# Patient Record
Sex: MALE | Race: BLACK OR AFRICAN AMERICAN | NOT HISPANIC OR LATINO | Employment: OTHER | URBAN - METROPOLITAN AREA
[De-identification: names, ages, dates, MRNs, and addresses within clinical notes are randomized per-mention and may not be internally consistent; named-entity substitution may affect disease eponyms.]

---

## 2017-02-01 ENCOUNTER — HOSPITAL ENCOUNTER (EMERGENCY)
Facility: HOSPITAL | Age: 57
Discharge: HOME | End: 2017-02-01
Attending: EMERGENCY MEDICINE
Payer: MEDICARE

## 2017-02-01 VITALS
TEMPERATURE: 99 F | HEIGHT: 68 IN | SYSTOLIC BLOOD PRESSURE: 113 MMHG | OXYGEN SATURATION: 99 % | WEIGHT: 160 LBS | HEART RATE: 99 BPM | DIASTOLIC BLOOD PRESSURE: 65 MMHG | BODY MASS INDEX: 24.25 KG/M2 | RESPIRATION RATE: 20 BRPM

## 2017-02-01 DIAGNOSIS — F29 PSYCHOSIS, UNSPECIFIED PSYCHOSIS TYPE: ICD-10-CM

## 2017-02-01 DIAGNOSIS — F20.3 UNDIFFERENTIATED SCHIZOPHRENIA: Primary | Chronic | ICD-10-CM

## 2017-02-01 LAB
ALBUMIN SERPL BCP-MCNC: 3.8 G/DL
ALP SERPL-CCNC: 228 U/L
ALT SERPL W/O P-5'-P-CCNC: 19 U/L
AMPHET+METHAMPHET UR QL: NEGATIVE
ANION GAP SERPL CALC-SCNC: 13 MMOL/L
APAP SERPL-MCNC: <3 UG/ML
AST SERPL-CCNC: 21 U/L
BARBITURATES UR QL SCN>200 NG/ML: NEGATIVE
BASOPHILS # BLD AUTO: 0.01 K/UL
BASOPHILS NFR BLD: 0.2 %
BENZODIAZ UR QL SCN>200 NG/ML: NEGATIVE
BILIRUB SERPL-MCNC: 0.5 MG/DL
BILIRUB UR QL STRIP: NEGATIVE
BUN SERPL-MCNC: 10 MG/DL
BZE UR QL SCN: NEGATIVE
CALCIUM SERPL-MCNC: 9.5 MG/DL
CANNABINOIDS UR QL SCN: NEGATIVE
CHLORIDE SERPL-SCNC: 102 MMOL/L
CLARITY UR REFRACT.AUTO: CLEAR
CO2 SERPL-SCNC: 23 MMOL/L
COLOR UR AUTO: YELLOW
CREAT SERPL-MCNC: 0.7 MG/DL
CREAT UR-MCNC: 52.7 MG/DL
DIFFERENTIAL METHOD: ABNORMAL
EOSINOPHIL # BLD AUTO: 0.3 K/UL
EOSINOPHIL NFR BLD: 4.3 %
ERYTHROCYTE [DISTWIDTH] IN BLOOD BY AUTOMATED COUNT: 14.8 %
EST. GFR  (AFRICAN AMERICAN): >60 ML/MIN/1.73 M^2
EST. GFR  (NON AFRICAN AMERICAN): >60 ML/MIN/1.73 M^2
ETHANOL SERPL-MCNC: <10 MG/DL
GLUCOSE SERPL-MCNC: 80 MG/DL
GLUCOSE UR QL STRIP: NEGATIVE
HCT VFR BLD AUTO: 41.3 %
HGB BLD-MCNC: 14.1 G/DL
HGB UR QL STRIP: NEGATIVE
KETONES UR QL STRIP: NEGATIVE
LEUKOCYTE ESTERASE UR QL STRIP: NEGATIVE
LYMPHOCYTES # BLD AUTO: 2 K/UL
LYMPHOCYTES NFR BLD: 33.1 %
MCH RBC QN AUTO: 29.2 PG
MCHC RBC AUTO-ENTMCNC: 34.1 %
MCV RBC AUTO: 86 FL
METHADONE UR QL SCN>300 NG/ML: NEGATIVE
MONOCYTES # BLD AUTO: 0.7 K/UL
MONOCYTES NFR BLD: 12.2 %
NEUTROPHILS # BLD AUTO: 3.1 K/UL
NEUTROPHILS NFR BLD: 50 %
NITRITE UR QL STRIP: NEGATIVE
OPIATES UR QL SCN: NEGATIVE
PCP UR QL SCN>25 NG/ML: NEGATIVE
PH UR STRIP: 7 [PH] (ref 5–8)
PLATELET # BLD AUTO: 244 K/UL
PMV BLD AUTO: 9 FL
POTASSIUM SERPL-SCNC: 3.8 MMOL/L
PROT SERPL-MCNC: 7.7 G/DL
PROT UR QL STRIP: NEGATIVE
RBC # BLD AUTO: 4.83 M/UL
SALICYLATES SERPL-MCNC: <5 MG/DL
SODIUM SERPL-SCNC: 138 MMOL/L
SP GR UR STRIP: 1.01 (ref 1–1.03)
TOXICOLOGY INFORMATION: NORMAL
TSH SERPL DL<=0.005 MIU/L-ACNC: 0.54 UIU/ML
URN SPEC COLLECT METH UR: NORMAL
UROBILINOGEN UR STRIP-ACNC: <2 EU/DL
WBC # BLD AUTO: 6.08 K/UL

## 2017-02-01 PROCEDURE — 99285 EMERGENCY DEPT VISIT HI MDM: CPT

## 2017-02-01 PROCEDURE — 82570 ASSAY OF URINE CREATININE: CPT

## 2017-02-01 PROCEDURE — 84443 ASSAY THYROID STIM HORMONE: CPT

## 2017-02-01 PROCEDURE — 85025 COMPLETE CBC W/AUTO DIFF WBC: CPT

## 2017-02-01 PROCEDURE — 80320 DRUG SCREEN QUANTALCOHOLS: CPT

## 2017-02-01 PROCEDURE — 81003 URINALYSIS AUTO W/O SCOPE: CPT

## 2017-02-01 PROCEDURE — 80329 ANALGESICS NON-OPIOID 1 OR 2: CPT

## 2017-02-01 PROCEDURE — 80307 DRUG TEST PRSMV CHEM ANLYZR: CPT | Mod: 59

## 2017-02-01 PROCEDURE — 80053 COMPREHEN METABOLIC PANEL: CPT

## 2017-02-01 RX ORDER — ZIPRASIDONE MESYLATE 20 MG/ML
20 INJECTION, POWDER, LYOPHILIZED, FOR SOLUTION INTRAMUSCULAR
Status: DISCONTINUED | OUTPATIENT
Start: 2017-02-01 | End: 2017-02-01

## 2017-02-01 NOTE — ED NOTES
Dr. Anand states pt to be PEC'd. Linh JOHNSON made aware. [facility] preparing room and pt. Kavon Colvin notified per Linh LEONARD

## 2017-02-01 NOTE — ED AVS SNAPSHOT
[facility]  [id number] [address] [address]               [place]   2017  8:53 AM   ED    Description:  Male : 1960   Department:  [facility]           Your Care was Coordinated By:     Provider Role From To    Aaron Anand MD Attending Provider 17 0850 --      Reason for Visit     verbal altercation           Diagnoses this Visit        Comments    Undifferentiated schizophrenia    -  Primary     Psychosis, unspecified psychosis type           ED Disposition     ED Disposition Condition Comment    Discharge             To Do List           [facility]     [facility] Care [facility] -  Assistance  Registered nurses in the [facility] provide clinical advisement, health education, appointment booking, and other advisory services.  Call for this free service at [phone].             Medications           Message regarding Medications     Verify the changes and/or additions to your medication regime listed below are the same as discussed with your clinician today.  If any of these changes or additions are incorrect, please notify your healthcare provider.        These medications were administered today        Dose Freq    ziprasidone injection 20 mg 20 mg ED 1 Time    Sig: Inject 20 mg into the muscle ED 1 Time.    Class: Normal    Route: Intramuscular           Verify that the below list of medications is an accurate representation of the medications you are currently taking.  If none reported, the list may be blank. If incorrect, please contact your healthcare provider. Carry this list with you in case of emergency.           Current Medications     benztropine (COGENTIN) 2 MG Tab Take 1 mg by mouth 2 (two) times daily.    divalproex (DEPAKOTE) 250 MG EC tablet Take 250 mg by mouth 3 (three) times daily.    ergocalciferol (ERGOCALCIFEROL) 50,000 unit Cap Take 50,000 Units by mouth every 7 days.     "ibuprofen (ADVIL,MOTRIN) 600 MG tablet Take 600 mg by mouth 3 (three) times daily.    lamotrigine (LAMICTAL) 25 MG tablet Take 1 tablet (25mg) daily x7 days, starting 9/16 take 2 tablets (50mg) daily x7 days, starting 9/23 take 3 tablets (75mg) daily x7 days.    levetiracetam (KEPPRA) 500 MG Tab Take 1,000 mg by mouth every evening.    multivitamin capsule Take 1 capsule by mouth once daily.    omeprazole (PRILOSEC) 20 MG capsule Take 20 mg by mouth once daily.    propranolol (INDERAL) 10 MG tablet Take 10 mg by mouth once daily.     quetiapine (SEROQUEL) 200 MG Tab Take by mouth.    ziprasidone injection 20 mg Inject 20 mg into the muscle ED 1 Time.           Clinical Reference Information           Your Vitals Were     BP Pulse Temp Resp Height Weight    156/81 (BP Location: Right arm, Patient Position: Sitting) 91 98.5 °F (36.9 °C) (Oral) 20 5' 8" (1.727 m) 72.6 kg (160 lb)    SpO2 BMI             98% 24.33 kg/m2         Allergies as of 2/1/2017     No Known Allergies      Immunizations Administered on Date of Encounter - 2/1/2017     None      ED Micro, Lab, POCT     Start Ordered       Status Ordering Provider    02/01/17 0854 02/01/17 0854  CBC auto differential  STAT      Final result     02/01/17 0854 02/01/17 0854  Comprehensive metabolic panel  STAT      Final result     02/01/17 0854 02/01/17 0854  Urinalysis  STAT      Final result     02/01/17 0854 02/01/17 0854  TSH  STAT      Final result     02/01/17 0854 02/01/17 0854  Drug screen panel, emergency  STAT      Final result     02/01/17 0854 02/01/17 0854  Ethanol  STAT      Final result     02/01/17 0854 02/01/17 0854  Salicylate level  STAT      Final result     02/01/17 0854 02/01/17 0854  Acetaminophen level  STAT      Final result       ED Imaging Orders     Start Ordered       Status Ordering Provider    02/01/17 0945 02/01/17 0944  X-ray Shoulder 2 or More Views Right  1 time imaging      Final result     02/01/17 0854 02/01/17 0854  CT Head " Without Contrast  1 time imaging      Final result         Discharge Instructions         Treating Schizophrenia  The symptoms of schizophrenia are severe and ongoing. They can disrupt lives and cause great suffering. But treatment may help relieve many of these symptoms. Most often, treatment includes both medication and counseling (psychotherapy). It also may involve help with social and life skills.    Medications  Medication is a key part of any treatment for schizophrenia. Medications known as antipsychotics can help ease present symptoms. They also may prevent future problems. These medications can have side effects. To avoid side effects, some people may even stop taking their medications. Unfortunately, this can cause their symptoms to come back. If your loved one has problems with medication, tell the health care provider. Changing the dose or type of medication may help. Your support and caring can also help a loved one stick with treatment.  Counseling (psychotherapy)  A therapist can help your loved one deal with problems caused by schizophrenia. Therapy may focus on healing relationships or coping with the disorder. A therapist can also provide emotional support.    Some people with schizophrenia may not be able to work. They also may lack basic life skills. For instance, they may not know how to shop or manage money. Some may not be able to care for themselves. Fortunately, there are professionals who can help them learn these skills. If you cant care for your loved one, there are special places he or she can live, such as [facility] houses and group homes. They are safe places for your loved one to start building a new life. There are also agencies that can assist with needs such as improving life skills and finding housing.  Looking ahead  Research into schizophrenia is ongoing. This may lead to improved treatments in the future. There is always hope for a better  life.  Resources  National [place] of Mental Health  788-458-3549  www.[facility].nih.gov  National [place] on Mental Illness  775.121.5168  www.reed.org  Mental Health [place]  545.599.2495  www.nmha.org  Schizophrenia.com  www.schizophrenia.com   © 2269-0910 [employer]. [address] [place] [address], [place] [address]. All rights reserved. This information is not intended as a substitute for professional medical care. Always follow your healthcare professional's instructions.          [facility] Sign-Up     Activating your [facility] account is as easy as 1-2-3!     1) Visit [email or web address], select Sign Up Now, enter this activation code and your date of birth, then select Next.  [email or web address]  Expires: 3/18/2017 11:03 AM      2) Create a username and password to use when you visit [facility] in the future and select a security question in case you lose your password and select Next.    3) Enter your e-mail address and click Sign Up!    Additional Information  If you have questions, please e-mail [email or web address] or call [phone] to talk to our [facility] staff. Remember, [facility] is NOT to be used for urgent needs. For medical emergencies, dial 911.         Smoking Cessation     If you would like to quit smoking:   You may be eligible for free services if you are a [place] resident and started smoking cigarettes before September 1, 1988.  Call the Smoking Cessation Trust (SCT) toll free at (057) 131-3451 or (422) 786-4567.   Call 3-800-QUIT-NOW if you do not meet the above criteria.             [facility] complies with applicable Federal civil rights laws and does not discriminate on the basis of race, color, national origin, age, disability, or sex.        Language Assistance Services     ATTENTION: Language assistance services are available, free of charge. Please call [phone].      ATENCIÓN: Si habla español, tiene a de la garza disposición servicios gratuitos de asistencia  lingüística. [facility] [phone].     REDD Ý: N?u b?n nói Ti?ng Vi?t, có các d?ch v? h? tr? ngôn ng? mi?n phí dành cho b?n. G?i s? [phone].

## 2017-02-01 NOTE — DISCHARGE INSTRUCTIONS
Treating Schizophrenia  The symptoms of schizophrenia are severe and ongoing. They can disrupt lives and cause great suffering. But treatment may help relieve many of these symptoms. Most often, treatment includes both medication and counseling (psychotherapy). It also may involve help with social and life skills.    Medications  Medication is a key part of any treatment for schizophrenia. Medications known as antipsychotics can help ease present symptoms. They also may prevent future problems. These medications can have side effects. To avoid side effects, some people may even stop taking their medications. Unfortunately, this can cause their symptoms to come back. If your loved one has problems with medication, tell the health care provider. Changing the dose or type of medication may help. Your support and caring can also help a loved one stick with treatment.  Counseling (psychotherapy)  A therapist can help your loved one deal with problems caused by schizophrenia. Therapy may focus on healing relationships or coping with the disorder. A therapist can also provide emotional support.    Some people with schizophrenia may not be able to work. They also may lack basic life skills. For instance, they may not know how to shop or manage money. Some may not be able to care for themselves. Fortunately, there are professionals who can help them learn these skills. If you cant care for your loved one, there are special places he or she can live, such as nursing home houses and group homes. They are safe places for your loved one to start building a new life. There are also agencies that can assist with needs such as improving life skills and finding housing.  Looking ahead  Research into schizophrenia is ongoing. This may lead to improved treatments in the future. There is always hope for a better life.  Resources  National [place] of Mental Health  329.527.8842  www.[facility].nih.gov  National [place] on  Mental Illness  697.291.6576  www.reed.org  Mental Health [place]  121.443.5246  www.[facility].org  Schizophrenia.com  www.schizophrenia.com   © 7503-4605 [employer]. [address] [place] [address], [place] [address]. All rights reserved. This information is not intended as a substitute for professional medical care. Always follow your healthcare professional's instructions.

## 2017-02-01 NOTE — ED NOTES
pts caregiver at bedside and states pt did not receive his meds this am because she was late and mother told her to come to [facility] to  pt . Dr. Anand spoke with her and pt . Pt calm and cooperative. Caregiver to give meds and sit with pt and pts mother at there home.Dr. Anand cancelled  pec. House sup informed per Linh castro rn and  office notified.

## 2017-02-01 NOTE — ED NOTES
After starting Iv, pts right arm was moved causing pt to yell with pain to right shoulder. MD informed

## 2017-02-01 NOTE — ED PROVIDER NOTES
Encounter Date: 2/1/2017       History     Chief Complaint   Patient presents with    verbal altercation     with mother when she called 911. Pt was calm prior to [facility] arrival and then was uncooperatiev saying everything hurt. Pt awake and oriented to self .      Review of patient's allergies indicates:  No Known Allergies  Patient is a 56 y.o. male presenting with the following complaint: mental health disorder. The history is provided by the EMS personnel.   Mental Health Problem   The primary symptoms include bizarre behavior, disorganized speech and negative symptoms. The current episode started today. This is a chronic problem.   The onset of the illness is precipitated by emotional stress and stressful event. The degree of incapacity that he is experiencing as a consequence of his illness is severe. Sequelae of the illness include harmed interpersonal relations and an inability to work. Risk factors that are present for mental illness include a history of mental illness.     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  Past Surgical History   Procedure Laterality Date    Brain surgery      Shoulder surgery      Knee surgery       Family History   Problem Relation Age of Onset    Kidney disease Mother     Diabetes Mother      Social History   Substance Use Topics    Smoking status: Current Every Day Smoker     Packs/day: 1.00     Years: 32.00     Types: Cigarettes    Smokeless tobacco: Never Used    Alcohol use No     Review of Systems   Constitutional: Negative for fever.   HENT: Negative for sore throat.    Respiratory: Negative for shortness of breath.    Cardiovascular: Negative for chest pain.   Gastrointestinal: Negative for nausea.   Genitourinary: Negative for  "dysuria.   Musculoskeletal: Negative for back pain.   Skin: Negative for rash.   Neurological: Negative for weakness.   Hematological: Does not bruise/bleed easily.       Physical Exam   Initial Vitals   BP Pulse Resp Temp SpO2   02/01/17 0855 02/01/17 0855 02/01/17 0855 02/01/17 0855 02/01/17 0855   156/81 91 20 98.5 °F (36.9 °C) 98 %     Physical Exam    Constitutional: He appears well-developed and well-nourished. No distress.   HENT:   Head: Normocephalic and atraumatic.   Eyes: Conjunctivae are normal. Pupils are equal, round, and reactive to light.   Neck: Normal range of motion. Neck supple.   Cardiovascular: Normal rate, regular rhythm and normal heart sounds.   Pulmonary/Chest: Breath sounds normal.   Abdominal: Soft. Bowel sounds are normal.   Musculoskeletal: Normal range of motion.   Neurological: He is alert and oriented to person, place, and time. No cranial nerve deficit.   Skin: Skin is warm and dry.   Psychiatric: His affect is blunt. His speech is delayed. He is slowed and withdrawn.         ED Course   Procedures  Labs Reviewed   CBC W/ AUTO DIFFERENTIAL - Abnormal; Notable for the following:        Result Value    RDW 14.8 (*)     MPV 9.0 (*)     All other components within normal limits   COMPREHENSIVE METABOLIC PANEL - Abnormal; Notable for the following:     Alkaline Phosphatase 228 (*)     All other components within normal limits   SALICYLATE LEVEL - Abnormal; Notable for the following:     Salicylate Lvl <5.0 (*)     All other components within normal limits   ACETAMINOPHEN LEVEL - Abnormal; Notable for the following:     Acetaminophen (Tylenol), Serum <3.0 (*)     All other components within normal limits   URINALYSIS   TSH   DRUG SCREEN PANEL, URINE EMERGENCY   ALCOHOL,MEDICAL (ETHANOL)        ED Vital Signs:  Vitals:    02/01/17 0855   BP: (!) 156/81   Pulse: 91   Resp: 20   Temp: 98.5 °F (36.9 °C)   TempSrc: Oral   SpO2: 98%   Weight: 72.6 kg (160 lb)   Height: 5' 8" (1.727 m) "         Abnormal Lab Results:  Labs Reviewed   CBC W/ AUTO DIFFERENTIAL - Abnormal; Notable for the following:        Result Value    RDW 14.8 (*)     MPV 9.0 (*)     All other components within normal limits   COMPREHENSIVE METABOLIC PANEL - Abnormal; Notable for the following:     Alkaline Phosphatase 228 (*)     All other components within normal limits   SALICYLATE LEVEL - Abnormal; Notable for the following:     Salicylate Lvl <5.0 (*)     All other components within normal limits   ACETAMINOPHEN LEVEL - Abnormal; Notable for the following:     Acetaminophen (Tylenol), Serum <3.0 (*)     All other components within normal limits   URINALYSIS   TSH   DRUG SCREEN PANEL, URINE EMERGENCY   ALCOHOL,MEDICAL (ETHANOL)          All Lab Results:  Results for orders placed or performed during the hospital encounter of 02/01/17   CBC auto differential   Result Value Ref Range    WBC 6.08 3.90 - 12.70 K/uL    RBC 4.83 4.60 - 6.20 M/uL    Hemoglobin 14.1 14.0 - 18.0 g/dL    Hematocrit 41.3 40.0 - 54.0 %    MCV 86 82 - 98 fL    MCH 29.2 27.0 - 31.0 pg    MCHC 34.1 32.0 - 36.0 %    RDW 14.8 (H) 11.5 - 14.5 %    Platelets 244 150 - 350 K/uL    MPV 9.0 (L) 9.2 - 12.9 fL    Gran # 3.1 1.8 - 7.7 K/uL    Lymph # 2.0 1.0 - 4.8 K/uL    Mono # 0.7 0.3 - 1.0 K/uL    Eos # 0.3 0.0 - 0.5 K/uL    Baso # 0.01 0.00 - 0.20 K/uL    Gran% 50.0 38.0 - 73.0 %    Lymph% 33.1 18.0 - 48.0 %    Mono% 12.2 4.0 - 15.0 %    Eosinophil% 4.3 0.0 - 8.0 %    Basophil% 0.2 0.0 - 1.9 %    Differential Method Automated    Comprehensive metabolic panel   Result Value Ref Range    Sodium 138 136 - 145 mmol/L    Potassium 3.8 3.5 - 5.1 mmol/L    Chloride 102 95 - 110 mmol/L    CO2 23 23 - 29 mmol/L    Glucose 80 70 - 110 mg/dL    BUN, Bld 10 6 - 20 mg/dL    Creatinine 0.7 0.5 - 1.4 mg/dL    Calcium 9.5 8.7 - 10.5 mg/dL    Total Protein 7.7 6.0 - 8.4 g/dL    Albumin 3.8 3.5 - 5.2 g/dL    Total Bilirubin 0.5 0.1 - 1.0 mg/dL    Alkaline Phosphatase 228 (H) 55 -  135 U/L    AST 21 10 - 40 U/L    ALT 19 10 - 44 U/L    Anion Gap 13 8 - 16 mmol/L    eGFR if African American >60.0 >60 mL/min/1.73 m^2    eGFR if non African American >60.0 >60 mL/min/1.73 m^2   Urinalysis   Result Value Ref Range    Specimen UA Urine, Clean Catch     Color, UA Yellow Yellow, Straw, Leslee    Appearance, UA Clear Clear    pH, UA 7.0 5.0 - 8.0    Specific Gravity, UA 1.010 1.005 - 1.030    Protein, UA Negative Negative    Glucose, UA Negative Negative    Ketones, UA Negative Negative    Bilirubin (UA) Negative Negative    Occult Blood UA Negative Negative    Nitrite, UA Negative Negative    Urobilinogen, UA <2.0 <2.0 EU/dL    Leukocytes, UA Negative Negative   TSH   Result Value Ref Range    TSH 0.541 0.400 - 4.000 uIU/mL   Drug screen panel, emergency   Result Value Ref Range    Benzodiazepines Negative     Methadone metabolites Negative     Cocaine (Metab.) Negative     Opiate Scrn, Ur Negative     Barbiturate Screen, Ur Negative     Amphetamine Screen, Ur Negative     THC Negative     Phencyclidine Negative     Creatinine, Random Ur 52.7 23.0 - 375.0 mg/dL    Toxicology Information SEE COMMENT    Ethanol   Result Value Ref Range    Alcohol, Medical, Serum <10 <10 mg/dL   Salicylate level   Result Value Ref Range    Salicylate Lvl <5.0 (L) 15.0 - 30.0 mg/dL   Acetaminophen level   Result Value Ref Range    Acetaminophen (Tylenol), Serum <3.0 (L) 10.0 - 20.0 ug/mL           Imaging Results:  Imaging Results         X-ray Shoulder 2 or More Views Right (Final result) Result time:  02/01/17 10:03:26    Final result by Cecelia Schofield MD (02/01/17 10:03:26)    Impression:      No definite acute process.  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      Electronically signed by: CECELIA SCHOFIELD MD  Date:     02/01/17  Time:    10:03     Narrative:    Exam: XR SHOULDER COMPLETE 2 VIEW RIGHT,    Date:  02/01/17 09:44:48    History: Right arm  pain.    Comparison:  No prior relevant studies available    Findings: Postoperative changes of the right shoulder are noted with a right humeral intramedullary lucie.  The lucie extends superior to the humeral neck.  Post traumatic healed deformity is present.  Prominent right shoulder arthropathy is present.            CT Head Without Contrast (Final result) Result time:  02/01/17 10:02:03    Final result by Cecelia Schofield MD (02/01/17 10:02:03)    Impression:      No acute findings.  Postoperative changes.  Atrophy with multifocal encephalomalacia.        All CT scans at this facility use dose modulation, iterative reconstruction and/or weight based dosing when appropriate to reduce radiation dose to as low as reasonably achievable.       Electronically signed by: CECELIA SCHOFIELD MD  Date:     02/01/17  Time:    10:02     Narrative:    CT HEAD WITHOUT CONTRAST     History:  Altered mental status.    Technique:  Noncontrast CT of the brain. Comparison with 09/07/2016.    Findings:  There are postoperative changes.  A left craniotomy is noted.  Right cranial vipul hole is evident.  Postoperative changes of the right base are also noted as well with old right facial fractures.  Intracranially there is moderate to marked ventriculomegaly.  Symmetric basal ganglia calcification is noted.  Left hemispheric volume loss with left temporal and parietal encephalomalacia as noted.    Small zone of right frontal encephalomalacia is noted.    No acute hemorrhage or mass effect.                 The Emergency Provider reviewed the vital signs and test results, which are outlined above.    ED Discussions:  10:28 AM: The PEC hold has been issued by Dr. Hernandez at this time for psychosis and schizophrenia.      10:38 AM: Pt has been medically cleared by Dr. Anand at this time. Reassessed pt at this time. Pt is resting comfortably and appears in no acute distress. There are no psychiatric services offered at this facility. D/w  pt all pertinent ED information and plan to transfer to psychiatric facility for psychiatric treatment. All questions and complaints have been addressed at this time. Pt condition is stable at this time and is clear to transfer to psychiatric facility at this time.     11:03 AM  Patient's caregiver is now at the bedside, and is giving a more detailed account of what had happened.  States he was mad at his mother, because he thought that she took his money.  There was no physical altercation.  The caregiver states that he is at his mental baseline, and feels comfortable taking him home.  States his mother forgot to give him his meds this morning and she will give them to him as soon as they get home.  All questions answered, and the patient is stable and ready for discharge.                       ED Course     Clinical Impression:       ICD-10-CM ICD-9-CM   1. Undifferentiated schizophrenia F20.3 295.90   2. Psychosis, unspecified psychosis type F29 298.9         Disposition:   Disposition: Discharged  Condition: Stable       Aaron Anand MD  02/01/17 1038       Aaron Anand MD  02/01/17 1105

## 2017-03-14 ENCOUNTER — OFFICE VISIT (OUTPATIENT)
Dept: INTERNAL MEDICINE | Facility: CLINIC | Age: 57
End: 2017-03-14
Payer: MEDICARE

## 2017-03-14 VITALS
BODY MASS INDEX: 25.13 KG/M2 | HEIGHT: 65 IN | HEART RATE: 88 BPM | DIASTOLIC BLOOD PRESSURE: 95 MMHG | SYSTOLIC BLOOD PRESSURE: 138 MMHG | TEMPERATURE: 98 F | OXYGEN SATURATION: 95 % | RESPIRATION RATE: 16 BRPM | WEIGHT: 150.81 LBS

## 2017-03-14 DIAGNOSIS — R56.9 SEIZURES: Chronic | ICD-10-CM

## 2017-03-14 DIAGNOSIS — G20.A1 PARKINSONS DISEASE: ICD-10-CM

## 2017-03-14 DIAGNOSIS — R03.0 ELEVATED BP WITHOUT DIAGNOSIS OF HYPERTENSION: ICD-10-CM

## 2017-03-14 DIAGNOSIS — G25.1 VALPROIC ACID-INDUCED TREMOR: Chronic | ICD-10-CM

## 2017-03-14 DIAGNOSIS — F20.3 UNDIFFERENTIATED SCHIZOPHRENIA: Primary | Chronic | ICD-10-CM

## 2017-03-14 DIAGNOSIS — M19.011 ARTHROPATHY OF RIGHT SHOULDER: ICD-10-CM

## 2017-03-14 DIAGNOSIS — T42.6X5A VALPROIC ACID-INDUCED TREMOR: Chronic | ICD-10-CM

## 2017-03-14 PROCEDURE — 99999 PR PBB SHADOW E&M-EST. PATIENT-LVL IV: CPT | Mod: PBBFAC,,, | Performed by: NURSE PRACTITIONER

## 2017-03-14 PROCEDURE — 99214 OFFICE O/P EST MOD 30 MIN: CPT | Mod: S$GLB,,, | Performed by: NURSE PRACTITIONER

## 2017-03-14 PROCEDURE — 1160F RVW MEDS BY RX/DR IN RCRD: CPT | Mod: S$GLB,,, | Performed by: NURSE PRACTITIONER

## 2017-03-14 RX ORDER — LEVETIRACETAM 1000 MG/1
1000 TABLET ORAL 2 TIMES DAILY
Qty: 60 TABLET | Refills: 1 | Status: SHIPPED | OUTPATIENT
Start: 2017-03-14 | End: 2017-05-01 | Stop reason: SDUPTHER

## 2017-03-14 RX ORDER — HALOPERIDOL 1 MG/1
1 TABLET ORAL 3 TIMES DAILY
COMMUNITY
End: 2017-03-14 | Stop reason: SDUPTHER

## 2017-03-14 RX ORDER — LACOSAMIDE 100 MG/1
100 TABLET ORAL 2 TIMES DAILY
Qty: 60 TABLET | Refills: 1 | Status: SHIPPED | OUTPATIENT
Start: 2017-03-14 | End: 2017-06-27 | Stop reason: SDUPTHER

## 2017-03-14 RX ORDER — BENZTROPINE MESYLATE 1 MG/1
1 TABLET ORAL DAILY
Qty: 30 TABLET | Refills: 1 | Status: SHIPPED | OUTPATIENT
Start: 2017-03-14 | End: 2017-06-27 | Stop reason: SDUPTHER

## 2017-03-14 RX ORDER — MELOXICAM 15 MG/1
15 TABLET ORAL DAILY PRN
Qty: 30 TABLET | Refills: 5 | Status: SHIPPED | OUTPATIENT
Start: 2017-03-14 | End: 2017-06-27 | Stop reason: SDUPTHER

## 2017-03-14 RX ORDER — HALOPERIDOL 1 MG/1
1 TABLET ORAL 3 TIMES DAILY
Qty: 90 TABLET | Refills: 1 | Status: SHIPPED | OUTPATIENT
Start: 2017-03-14 | End: 2017-06-27 | Stop reason: SDUPTHER

## 2017-03-14 RX ORDER — LACOSAMIDE 100 MG/1
50 TABLET ORAL 2 TIMES DAILY
COMMUNITY
End: 2017-03-14 | Stop reason: SDUPTHER

## 2017-03-14 RX ORDER — OLANZAPINE 5 MG/1
5 TABLET ORAL 2 TIMES DAILY
Qty: 60 TABLET | Refills: 1 | Status: SHIPPED | OUTPATIENT
Start: 2017-03-14 | End: 2017-05-30 | Stop reason: DRUGHIGH

## 2017-03-14 RX ORDER — OLANZAPINE 5 MG/1
5 TABLET ORAL 2 TIMES DAILY
COMMUNITY
End: 2017-03-14 | Stop reason: SDUPTHER

## 2017-03-14 RX ORDER — LORAZEPAM 1 MG/1
1 TABLET ORAL EVERY 8 HOURS PRN
COMMUNITY
End: 2017-06-27 | Stop reason: SDUPTHER
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
## 2017-03-14 NOTE — MR AVS SNAPSHOT
[facility]  [address] [address]  [place] [address]  Phone: [phone]                  Smooth Spring   3/14/2017 2:00 PM   Office Visit    Description:  Male : 1960   Provider:  PATRICIA Mota,FNP-C   Department:  [facility]           Reason for Visit     Arm Pain     Leg Pain           Diagnoses this Visit        Comments    Undifferentiated schizophrenia    -  Primary     Seizures         Valproic acid-induced tremor         Convulsions, unspecified convulsion type         Arthropathy of right shoulder                To Do List           Future Appointments        Provider Department Dept Phone    2017 10:40 AM Tai Beltre,  [facility] [phone]    5/10/2017 9:40 AM Anson Dong MD [facility] Neurology [phone]      Goals (5 Years of Data)     None       These Medications        Disp Refills Start End    olanzapine (ZYPREXA) 5 MG tablet 60 tablet 1 3/14/2017     Take 1 tablet (5 mg total) by mouth 2 (two) times daily. - Oral    Pharmacy: [facility]/pharmacy #[id number] - [address] Ph #: [phone]       levetiracetam (KEPPRA) 1000 MG tablet 60 tablet 1 3/14/2017     Take 1 tablet (1,000 mg total) by mouth 2 (two) times daily. - Oral    Pharmacy: [facility]/pharmacy #Counts include 234 beds at the [facility] - [address] Ph #: [phone]       lacosamide (VIMPAT) 100 mg Tab 60 tablet 1 3/14/2017     Take 1 tablet (100 mg total) by mouth 2 (two) times daily. - Oral    Pharmacy: [facility]/pharmacy #[id number] - [address] Ph #: [phone]       haloperidol (HALDOL) 1 MG tablet 90 tablet 1 3/14/2017     Take 1 tablet (1 mg total) by mouth 3 (three) times daily. - Oral    Pharmacy: [facility]/pharmacy [facility]  [place] Ph #: [phone]       benztropine (COGENTIN) 1 MG tablet 30 tablet 1 3/14/2017     Take 1 tablet (1 mg total) by mouth once daily. - Oral    Pharmacy:  [facility]/pharmacy #[id number] - [place] Ph #: [phone]       meloxicam (MOBIC) 15 MG tablet 30 tablet 5 3/14/2017     Take 1 tablet (15 mg total) by mouth daily as needed for Pain. - Oral    Pharmacy: [facility]/pharmacy #[id number] - [place] [address] Ph #: [phone]         [facility]     [facility] On Call Nurse Care Line - 24/7 Assistance  Registered nurses in the [facility] On Call Center provide clinical advisement, health education, appointment booking, and other advisory services.  Call for this free service at [phone].             Medications           Message regarding Medications     Verify the changes and/or additions to your medication regime listed below are the same as discussed with your clinician today.  If any of these changes or additions are incorrect, please notify your healthcare provider.        START taking these NEW medications        Refills    olanzapine (ZYPREXA) 5 MG tablet 1    Sig: Take 1 tablet (5 mg total) by mouth 2 (two) times daily.    Class: Normal    Route: Oral    lacosamide (VIMPAT) 100 mg Tab 1    Sig: Take 1 tablet (100 mg total) by mouth 2 (two) times daily.    Class: Print    Route: Oral    haloperidol (HALDOL) 1 MG tablet 1    Sig: Take 1 tablet (1 mg total) by mouth 3 (three) times daily.    Class: Normal    Route: Oral    meloxicam (MOBIC) 15 MG tablet 5    Sig: Take 1 tablet (15 mg total) by mouth daily as needed for Pain.    Class: Normal    Route: Oral      CHANGE how you are taking these medications     Start Taking Instead of    levetiracetam (KEPPRA) 1000 MG tablet levetiracetam (KEPPRA) 500 MG Tab    Dosage:  Take 1 tablet (1,000 mg total) by mouth 2 (two) times daily. Dosage:  Take 1,000 mg by mouth 2 (two) times daily.    Reason for Change:  Reorder     benztropine (COGENTIN) 1 MG tablet benztropine (COGENTIN) 2 MG Tab    Dosage:  Take 1 tablet (1 mg total) by mouth once daily. Dosage:  Take 1 mg by mouth 2 (two) times  daily.    Reason for Change:  Reorder       STOP taking these medications     divalproex (DEPAKOTE) 250 MG EC tablet Take 250 mg by mouth 3 (three) times daily.    ergocalciferol (ERGOCALCIFEROL) 50,000 unit Cap Take 50,000 Units by mouth every 7 days.    ibuprofen (ADVIL,MOTRIN) 600 MG tablet Take 600 mg by mouth 3 (three) times daily.    lamotrigine (LAMICTAL) 25 MG tablet Take 1 tablet (25mg) daily x7 days, starting 9/16 take 2 tablets (50mg) daily x7 days, starting 9/23 take 3 tablets (75mg) daily x7 days.    multivitamin capsule Take 1 capsule by mouth once daily.    omeprazole (PRILOSEC) 20 MG capsule Take 20 mg by mouth once daily.    propranolol (INDERAL) 10 MG tablet Take 10 mg by mouth once daily.     quetiapine (SEROQUEL) 200 MG Tab Take by mouth.           Verify that the below list of medications is an accurate representation of the medications you are currently taking.  If none reported, the list may be blank. If incorrect, please contact your healthcare provider. Carry this list with you in case of emergency.           Current Medications     benztropine (COGENTIN) 1 MG tablet Take 1 tablet (1 mg total) by mouth once daily.    haloperidol (HALDOL) 1 MG tablet Take 1 tablet (1 mg total) by mouth 3 (three) times daily.    lacosamide (VIMPAT) 100 mg Tab Take 1 tablet (100 mg total) by mouth 2 (two) times daily.    levetiracetam (KEPPRA) 1000 MG tablet Take 1 tablet (1,000 mg total) by mouth 2 (two) times daily.    lorazepam (ATIVAN) 1 MG tablet Take 1 mg by mouth every 8 (eight) hours as needed for Anxiety.    meloxicam (MOBIC) 15 MG tablet Take 1 tablet (15 mg total) by mouth daily as needed for Pain.    olanzapine (ZYPREXA) 5 MG tablet Take 1 tablet (5 mg total) by mouth 2 (two) times daily.           Clinical Reference Information           Your Vitals Were     BP Pulse Temp Resp    138/95 (BP Location: Left arm, Patient Position: Sitting, BP Method: Automatic) 88 97.9 °F (36.6 °C) (Tympanic) 16     "Height Weight SpO2 BMI    5' 5" (1.651 m) 68.4 kg (150 lb 12.7 oz) 95% 25.09 kg/m2      Blood Pressure          Most Recent Value    BP  (!)  138/95      Allergies as of 3/14/2017     No Known Allergies      Immunizations Administered on Date of Encounter - 3/14/2017     None      Orders Placed During Today's Visit      Normal Orders This Visit    Ambulatory consult to Psychiatry     Ambulatory Referral to Neurology       Smoking Cessation     If you would like to quit smoking:   You may be eligible for free services if you are a [place] resident and started smoking cigarettes before September 1, 1988.  Call the Smoking Cessation Trust ([facility]) toll free at (738) 468-7742 or (372) 097-0092.   Call 1-800-QUIT-NOW if you do not meet the above criteria.            Language Assistance Services     ATTENTION: Language assistance services are available, free of charge. Please call [phone].      ATENCIÓN: Si habla español, tiene a de la garza disposición servicios gratuitos de asistencia lingüística. Llame al [phone].     CHÚ Ý: N?u b?n nói Ti?ng Vi?t, có các d?ch v? h? tr? ngôn ng? mi?n phí dành cho b?n. G?i s? [phone].         [facility] complies with applicable Federal civil rights laws and does not discriminate on the basis of race, color, national origin, age, disability, or sex.        "

## 2017-03-30 ENCOUNTER — TELEPHONE (OUTPATIENT)
Dept: INTERNAL MEDICINE | Facility: CLINIC | Age: 57
End: 2017-03-30

## 2017-04-03 ENCOUNTER — PATIENT OUTREACH (OUTPATIENT)
Dept: ADMINISTRATIVE | Facility: HOSPITAL | Age: 57
End: 2017-04-03

## 2017-04-03 NOTE — LETTER
April 3, 2017    Smooth Spring  [address] [address] [address]             [facility]  [address]  [place] [address]  Phone: [phone] Dear Mr. Spring:    [facility] is committed to your overall health.  To help you get the most out of each of your visits, we will review your information to make sure you are up to date on all of your recommended tests and/or procedures.      DR. CLAUDE GOFF has found that you may be due for   Health Maintenance Due   Topic    Hepatitis C Screening     Lipid Panel     Pneumococcal PPSV23 (Medium Risk) (1)    Colonoscopy     Influenza Vaccine         If you have had any of the above done at another facility, please bring the records or information with you so that your record at [facility] will be complete.    If you are currently taking medication, please bring it with you to your appointment for review.    We will be happy to assist you with scheduling any necessary appointments or you may contact the [facility] appointment desk at [phone] to schedule at your convenience.     Thank you for choosing [facility] for your healthcare needs,  If you have any questions or concerns, please don't hesitate to call.    Sincerely,  Francoise PIERSON LPN Care Coordinator  [facility]

## 2017-04-08 DIAGNOSIS — R56.9 SEIZURES: Chronic | ICD-10-CM

## 2017-04-10 RX ORDER — LACOSAMIDE 100 MG/1
TABLET, FILM COATED ORAL
Qty: 60 TABLET | Refills: 0 | OUTPATIENT
Start: 2017-04-10

## 2017-04-10 NOTE — TELEPHONE ENCOUNTER
Please let pt know that all meds were sent with 1 refill when he saw me. He will need to keep f/u with Dr Beltre and antonio for further refills

## 2017-05-01 DIAGNOSIS — R56.9 SEIZURES: Chronic | ICD-10-CM

## 2017-05-02 RX ORDER — LEVETIRACETAM 1000 MG/1
TABLET ORAL
Qty: 60 TABLET | Refills: 0 | Status: SHIPPED | OUTPATIENT
Start: 2017-05-02 | End: 2017-06-27 | Stop reason: SDUPTHER

## 2017-05-02 NOTE — TELEPHONE ENCOUNTER
Pt requested refills. Pt is due for a follow up. 30 days supply was sent to pharmacy. Pt will need to f/u with  Dr Beltre for additional refills, missed last appt 4/17. Please help pt schedule. Keep appt with Dr Dong this week

## 2017-05-30 ENCOUNTER — HOSPITAL ENCOUNTER (EMERGENCY)
Facility: HOSPITAL | Age: 57
Discharge: HOME | End: 2017-05-30
Attending: EMERGENCY MEDICINE
Payer: MEDICARE

## 2017-05-30 VITALS
HEIGHT: 70 IN | HEART RATE: 89 BPM | DIASTOLIC BLOOD PRESSURE: 77 MMHG | TEMPERATURE: 100 F | WEIGHT: 150 LBS | BODY MASS INDEX: 21.47 KG/M2 | SYSTOLIC BLOOD PRESSURE: 160 MMHG | OXYGEN SATURATION: 96 % | RESPIRATION RATE: 20 BRPM

## 2017-05-30 DIAGNOSIS — F20.3 UNDIFFERENTIATED SCHIZOPHRENIA: Chronic | ICD-10-CM

## 2017-05-30 DIAGNOSIS — G40.909 RECURRENT SEIZURES: Primary | ICD-10-CM

## 2017-05-30 LAB
ALBUMIN SERPL BCP-MCNC: 4 G/DL
ALP SERPL-CCNC: 178 U/L
ALT SERPL W/O P-5'-P-CCNC: 10 U/L
ANION GAP SERPL CALC-SCNC: 16 MMOL/L
AST SERPL-CCNC: 17 U/L
BASOPHILS # BLD AUTO: 0.02 K/UL
BASOPHILS NFR BLD: 0.3 %
BILIRUB SERPL-MCNC: 0.6 MG/DL
BUN SERPL-MCNC: 7 MG/DL
CALCIUM SERPL-MCNC: 9.4 MG/DL
CHLORIDE SERPL-SCNC: 102 MMOL/L
CO2 SERPL-SCNC: 24 MMOL/L
CREAT SERPL-MCNC: 0.8 MG/DL
DIFFERENTIAL METHOD: NORMAL
EOSINOPHIL # BLD AUTO: 0.1 K/UL
EOSINOPHIL NFR BLD: 1.7 %
ERYTHROCYTE [DISTWIDTH] IN BLOOD BY AUTOMATED COUNT: 13.1 %
EST. GFR  (AFRICAN AMERICAN): >60 ML/MIN/1.73 M^2
EST. GFR  (NON AFRICAN AMERICAN): >60 ML/MIN/1.73 M^2
GLUCOSE SERPL-MCNC: 90 MG/DL
HCT VFR BLD AUTO: 45.6 %
HGB BLD-MCNC: 15.4 G/DL
LYMPHOCYTES # BLD AUTO: 2.1 K/UL
LYMPHOCYTES NFR BLD: 32.8 %
MCH RBC QN AUTO: 29.4 PG
MCHC RBC AUTO-ENTMCNC: 33.8 %
MCV RBC AUTO: 87 FL
MONOCYTES # BLD AUTO: 0.5 K/UL
MONOCYTES NFR BLD: 7.1 %
NEUTROPHILS # BLD AUTO: 3.7 K/UL
NEUTROPHILS NFR BLD: 57.9 %
PLATELET # BLD AUTO: 277 K/UL
PMV BLD AUTO: 9.5 FL
POTASSIUM SERPL-SCNC: 3.4 MMOL/L
PROT SERPL-MCNC: 7.9 G/DL
RBC # BLD AUTO: 5.23 M/UL
SODIUM SERPL-SCNC: 142 MMOL/L
WBC # BLD AUTO: 6.32 K/UL

## 2017-05-30 PROCEDURE — 96365 THER/PROPH/DIAG IV INF INIT: CPT

## 2017-05-30 PROCEDURE — 25000003 PHARM REV CODE 250: Performed by: EMERGENCY MEDICINE

## 2017-05-30 PROCEDURE — 99284 EMERGENCY DEPT VISIT MOD MDM: CPT | Mod: 25

## 2017-05-30 PROCEDURE — 85025 COMPLETE CBC W/AUTO DIFF WBC: CPT

## 2017-05-30 PROCEDURE — 80053 COMPREHEN METABOLIC PANEL: CPT

## 2017-05-30 RX ORDER — OLANZAPINE 5 MG/1
5 TABLET ORAL 2 TIMES DAILY
Qty: 60 TABLET | Refills: 0 | Status: SHIPPED | OUTPATIENT
Start: 2017-05-30 | End: 2017-06-27 | Stop reason: SDUPTHER

## 2017-05-30 RX ORDER — MELOXICAM 7.5 MG/1
TABLET ORAL
Refills: 1 | COMMUNITY
Start: 2017-05-04 | End: 2017-06-27

## 2017-05-30 RX ADMIN — SODIUM CHLORIDE 1000 ML: 0.9 INJECTION, SOLUTION INTRAVENOUS at 06:05

## 2017-05-30 NOTE — ED NOTES
Pt arrived via [facility] after having witnessed seizure per family. Pt denies any N/V/D. Pt denies any pain. Pt back at baseline at this time. Hx of seizures, parkinson's, and schizophrenia.     Level of Consciousness: Patient is awake, alert, oriented to person, disoriented to place, situation, and year.     Appearance: Pt resting comfortably in stretcher, no acute distress at this time. Clothing appropriately placed and clean. Pt smells of urine.   Skin: Skin is warm, dry, and intact. Skin turgor is normal/elastic. Mucous membranes moist. Skin color is normal for ethnicity. No skin breakdown noted.  Musculoskeletal: Moves all extremities well. Full active ROM. No deformities noted. Denies any weakness. Gait unobserved at this time. Pt ambulates without assistance at home.   Respiratory: Airway open and patent. Respirations equal and unlabored. Breath sounds clear to auscultation. Denies any SOB.   Cardiac: Regular rate and rhythm. No peripheral edema noted. Radial and pedal pulses present and normal. Capillary refill is within normal limits. Denies chest pain.    GI: Abdomen soft, non-tender to all quadrants with palpitation. Bowel sounds present and active in all quads. Abdomen symmetric with no distention noted. Denies any N/V/D.    Neurological: Symmetrical expressions noted to face. Equal bilateral . Normal sensation reported to all extremities. No obvious neurological deficits noted. Hand tremors noted.   Psychosocial: Speech spontaneous, clear, and coherent. Pt is calm and cooperative.     Pt informed of plan of care and denies any other questions, complaints, or concerns at this time. Bed in locked in lowest position, siderails up x2, call light within reach. Will continue to monitor.

## 2017-05-30 NOTE — ED NOTES
Unable to obtain sufficient blood for labs with IV insertion. Butterfly stick performed to left hand for lab draw. Pt tolerated. drsg applied.
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
## 2017-05-30 NOTE — TELEPHONE ENCOUNTER
This pt needs to come in to us or to psych for further refills. He has only been seen once in our clinic once and was referred to psych and neuro. He has completed neither referral and missed his f/u appt with Dr Beltre. A 30 day supply has been sent, no other refills will be granted until follow up. Pt is schizophrenic, please discuss this with his contact/caretaker. Thanks

## 2017-05-31 NOTE — ED NOTES
Pt able to stand and use urinal at bedside without assistance. Ambulatory without difficulty or need for assistance. Family arrived and reports pt is out of seizure medication; however, bottles of keppra and vimpat are at bedside with medication in prescription bottle. Mother and other family member at bedside at this time and updated on plan of care.

## 2017-05-31 NOTE — ED NOTES
Dr. Nation aware of pt's vital signs & states OK for discharge at this time. Pt is stable, in NAD, RR equal & unlabored. Per pt's mother he is at his neurologic baseline & there has been no further seizure activity while pt here in ED. Pt & family members deny any further needs, questions, concerns or complaints. Will d/c per MD order.

## 2017-06-01 ENCOUNTER — TELEPHONE (OUTPATIENT)
Dept: INTERNAL MEDICINE | Facility: CLINIC | Age: 57
End: 2017-06-01

## 2017-06-01 NOTE — TELEPHONE ENCOUNTER
Pt mother contacted, informed mother pt med was sent to [facility]. appt scheduled for follow up 6/27/17 with np

## 2017-06-13 ENCOUNTER — PATIENT OUTREACH (OUTPATIENT)
Dept: ADMINISTRATIVE | Facility: HOSPITAL | Age: 57
End: 2017-06-13

## 2017-06-13 NOTE — LETTER
June 13, 2017    Smooth Spring  [address] [facility] [address]             [facility]  [address]  [facility] [address]  Phone: [phone] Dear Mr. Spring:    [facility] is committed to your overall health.  To help you get the most out of each of your visits, we will review your information to make sure you are up to date on all of your recommended tests and/or procedures.      Deepti Patel NP has found that you may be due for   Health Maintenance Due   Topic    Hepatitis C Screening     Lipid Panel     Pneumococcal PPSV23 (Medium Risk) (1)    Colonoscopy         If you have had any of the above done at another facility, please bring the records or information with you so that your record at [facility] will be complete.    If you are currently taking medication, please bring it with you to your appointment for review.    We will be happy to assist you with scheduling any necessary appointments or you may contact the [facility] appointment desk at [phone] to schedule at your convenience.   Thank you for choosing [facility] for your healthcare needs,    If you have any questions or concerns, please don't hesitate to call.    Sincerely,  Carla JOVEL LPN  Care Coordination Department  [facility]

## 2017-06-27 ENCOUNTER — OFFICE VISIT (OUTPATIENT)
Dept: INTERNAL MEDICINE | Facility: CLINIC | Age: 57
End: 2017-06-27
Payer: MEDICARE

## 2017-06-27 VITALS
OXYGEN SATURATION: 98 % | RESPIRATION RATE: 20 BRPM | WEIGHT: 145.5 LBS | SYSTOLIC BLOOD PRESSURE: 156 MMHG | HEIGHT: 70 IN | HEART RATE: 63 BPM | TEMPERATURE: 99 F | DIASTOLIC BLOOD PRESSURE: 71 MMHG | BODY MASS INDEX: 20.83 KG/M2

## 2017-06-27 DIAGNOSIS — K21.9 GASTROESOPHAGEAL REFLUX DISEASE, ESOPHAGITIS PRESENCE NOT SPECIFIED: ICD-10-CM

## 2017-06-27 DIAGNOSIS — R56.9 SEIZURES: Primary | Chronic | ICD-10-CM

## 2017-06-27 DIAGNOSIS — Z13.6 ENCOUNTER FOR LIPID SCREENING FOR CARDIOVASCULAR DISEASE: ICD-10-CM

## 2017-06-27 DIAGNOSIS — F20.9 SCHIZOPHRENIA, UNSPECIFIED TYPE: ICD-10-CM

## 2017-06-27 DIAGNOSIS — G25.1 VALPROIC ACID-INDUCED TREMOR: Chronic | ICD-10-CM

## 2017-06-27 DIAGNOSIS — Z11.59 ENCOUNTER FOR HEPATITIS C SCREENING TEST FOR LOW RISK PATIENT: ICD-10-CM

## 2017-06-27 DIAGNOSIS — T42.6X5A VALPROIC ACID-INDUCED TREMOR: Chronic | ICD-10-CM

## 2017-06-27 DIAGNOSIS — Z13.220 ENCOUNTER FOR LIPID SCREENING FOR CARDIOVASCULAR DISEASE: ICD-10-CM

## 2017-06-27 DIAGNOSIS — F20.3 UNDIFFERENTIATED SCHIZOPHRENIA: Chronic | ICD-10-CM

## 2017-06-27 DIAGNOSIS — G20.A1 PARKINSONS DISEASE: ICD-10-CM

## 2017-06-27 DIAGNOSIS — M19.011 ARTHROPATHY OF RIGHT SHOULDER: ICD-10-CM

## 2017-06-27 PROCEDURE — 99999 PR PBB SHADOW E&M-EST. PATIENT-LVL IV: CPT | Mod: PBBFAC,,, | Performed by: NURSE PRACTITIONER

## 2017-06-27 PROCEDURE — 99215 OFFICE O/P EST HI 40 MIN: CPT | Mod: S$GLB,,, | Performed by: NURSE PRACTITIONER

## 2017-06-27 RX ORDER — PROPRANOLOL HYDROCHLORIDE 10 MG/1
10 TABLET ORAL DAILY
COMMUNITY
End: 2017-06-27 | Stop reason: SDUPTHER

## 2017-06-27 RX ORDER — LEVETIRACETAM 1000 MG/1
TABLET ORAL
Qty: 60 TABLET | Refills: 1 | Status: SHIPPED | OUTPATIENT
Start: 2017-06-27 | End: 2017-07-31 | Stop reason: SDUPTHER

## 2017-06-27 RX ORDER — BENZTROPINE MESYLATE 1 MG/1
1 TABLET ORAL DAILY
Qty: 30 TABLET | Refills: 1 | Status: SHIPPED | OUTPATIENT
Start: 2017-06-27 | End: 2017-08-26 | Stop reason: SDUPTHER

## 2017-06-27 RX ORDER — DIVALPROEX SODIUM 250 MG/1
250 TABLET, FILM COATED, EXTENDED RELEASE ORAL 2 TIMES DAILY
Qty: 60 TABLET | Refills: 1 | Status: SHIPPED | OUTPATIENT
Start: 2017-06-27 | End: 2017-08-26 | Stop reason: SDUPTHER

## 2017-06-27 RX ORDER — OMEPRAZOLE 20 MG/1
20 CAPSULE, DELAYED RELEASE ORAL DAILY
COMMUNITY
End: 2017-06-27 | Stop reason: SDUPTHER

## 2017-06-27 RX ORDER — MELOXICAM 15 MG/1
15 TABLET ORAL DAILY PRN
Qty: 30 TABLET | Refills: 5 | Status: SHIPPED | OUTPATIENT
Start: 2017-06-27 | End: 2017-08-25

## 2017-06-27 RX ORDER — OLANZAPINE 5 MG/1
5 TABLET ORAL 2 TIMES DAILY
Qty: 60 TABLET | Refills: 1 | Status: SHIPPED | OUTPATIENT
Start: 2017-06-27 | End: 2018-02-06 | Stop reason: SDUPTHER

## 2017-06-27 RX ORDER — HALOPERIDOL 1 MG/1
1 TABLET ORAL 3 TIMES DAILY
Qty: 90 TABLET | Refills: 1 | Status: SHIPPED | OUTPATIENT
Start: 2017-06-27 | End: 2017-11-20 | Stop reason: SDUPTHER

## 2017-06-27 RX ORDER — PROPRANOLOL HYDROCHLORIDE 10 MG/1
10 TABLET ORAL DAILY
Qty: 90 TABLET | Refills: 3 | Status: SHIPPED | OUTPATIENT
Start: 2017-06-27 | End: 2017-11-20 | Stop reason: SDUPTHER

## 2017-06-27 RX ORDER — OMEPRAZOLE 20 MG/1
20 CAPSULE, DELAYED RELEASE ORAL DAILY
Qty: 90 CAPSULE | Refills: 3 | Status: SHIPPED | OUTPATIENT
Start: 2017-06-27 | End: 2017-11-20 | Stop reason: SDUPTHER

## 2017-06-27 RX ORDER — LORAZEPAM 1 MG/1
1 TABLET ORAL EVERY 8 HOURS PRN
Qty: 60 TABLET | Refills: 0 | Status: SHIPPED | OUTPATIENT
Start: 2017-06-27 | End: 2018-05-03 | Stop reason: ALTCHOICE

## 2017-06-27 RX ORDER — LACOSAMIDE 100 MG/1
100 TABLET ORAL 2 TIMES DAILY
Qty: 60 TABLET | Refills: 1 | Status: SHIPPED | OUTPATIENT
Start: 2017-06-27 | End: 2017-08-22 | Stop reason: SDUPTHER

## 2017-06-27 RX ORDER — DIVALPROEX SODIUM 250 MG/1
250 TABLET, FILM COATED, EXTENDED RELEASE ORAL 2 TIMES DAILY
COMMUNITY
End: 2017-06-27 | Stop reason: SDUPTHER

## 2017-06-28 NOTE — PROGRESS NOTES
Subjective:       Patient ID: Smooth Spring is a 57 y.o. male.    Chief Complaint: Follow-up and Seizures (today at home been out of seizure meds for several weeks)    Mr Spring presents to the clinic today with his mother and her aid for medication refills. They come today because he has been having a few seizures for the last 2 days. He has been out of all meds for a few weeks. He requires a lot of assistance caring for himself due to his seizures and schizophrenia. His mother helps with this but she is also ill and on dialysis and has a aid who helps her. She states she has trouble getting Mr Spring to and from appointments and sometimes gets help from her niece, but not much. Mr Spring has not other support to speak of.      Seizures    This is a chronic problem. The current episode started yesterday. The problem has been rapidly improving. There were 2 to 3 seizures. The most recent episode lasted less than 30 seconds. Associated symptoms include sleepiness and confusion. Pertinent negatives include no headaches, no speech difficulty, no visual disturbance, no nausea, no vomiting and no muscle weakness. Characteristics include rhythmic jerking. Characteristics do not include eye blinking, eye deviation, bowel incontinence, bladder incontinence, loss of consciousness, bit tongue, apnea or cyanosis. The episode was witnessed. There was no sensation of an aura present. The seizure(s) had no focality. Possible causes include missed seizure meds (has been out of all meds for weeks). There has been no fever.     Review of Systems   Constitutional: Negative.    HENT: Negative.    Eyes: Negative.  Negative for visual disturbance.   Respiratory: Negative.  Negative for apnea.    Cardiovascular: Negative.  Negative for cyanosis.   Gastrointestinal: Negative.  Negative for bowel incontinence, nausea and vomiting.   Endocrine: Negative.    Genitourinary: Negative.  Negative for bladder incontinence.   Musculoskeletal: Negative.     Skin: Negative.    Allergic/Immunologic: Negative.    Neurological: Positive for seizures. Negative for dizziness, loss of consciousness, speech difficulty, weakness and headaches.   Hematological: Negative.    Psychiatric/Behavioral: Positive for confusion and dysphoric mood. Negative for agitation, behavioral problems, self-injury and suicidal ideas. The patient is nervous/anxious.        Objective:      Physical Exam   Constitutional: He appears well-developed and well-nourished. No distress.   HENT:   Head: Normocephalic and atraumatic.   Right Ear: External ear normal.   Left Ear: External ear normal.   Nose: Nose normal.   Mouth/Throat: Oropharynx is clear and moist. No oropharyngeal exudate.   Eyes: Conjunctivae are normal. Pupils are equal, round, and reactive to light. Right eye exhibits no discharge. Left eye exhibits no discharge.   Neck: Normal range of motion. Neck supple. No thyromegaly present.   Cardiovascular: Normal rate, regular rhythm, normal heart sounds and intact distal pulses.    No murmur heard.  Pulmonary/Chest: Effort normal and breath sounds normal. No respiratory distress. He has no wheezes.   Abdominal: Soft. Bowel sounds are normal. He exhibits no distension. There is no tenderness.   Musculoskeletal: Normal range of motion. He exhibits no edema or tenderness.   Lymphadenopathy:     He has no cervical adenopathy.   Neurological: He is alert.   Oriented to self and somewhat to situation   Skin: Skin is warm and dry. No rash noted. He is not diaphoretic.   Psychiatric: His mood appears anxious. His speech is delayed and tangential. He is slowed and withdrawn. He is not actively hallucinating. Thought content is delusional. Thought content is not paranoid. Cognition and memory are impaired. He expresses inappropriate judgment. He expresses no suicidal ideation. He is attentive.   Vitals reviewed.      Assessment:       1. Seizures    2. Schizophrenia, unspecified type    3. Valproic  acid-induced tremor    4. Parkinsons disease    5. Undifferentiated schizophrenia    6. Arthropathy of right shoulder    7. Encounter for lipid screening for cardiovascular disease    8. Encounter for hepatitis C screening test for low risk patient        Plan:       *Seizures  Reschedule neuro consult  To ER or RTC if seizures continue/ are more severe/ or there is any LOC or apnea  -     Ambulatory referral to Outpatient Case Management  -     Levetiracetam level; Future; Expected date: 06/27/2017  -     VALPROIC ACID; Future; Expected date: 06/27/2017  -     Lacosamide (Vimpat); Future; Expected date: 06/27/2017  -     divalproex ER (DEPAKOTE ER) 250 MG 24 hr tablet; Take 1 tablet (250 mg total) by mouth 2 (two) times daily.  Dispense: 60 tablet; Refill: 1  -     lacosamide (VIMPAT) 100 mg Tab; Take 1 tablet (100 mg total) by mouth 2 (two) times daily.  Dispense: 60 tablet; Refill: 1  -     levetiracetam (KEPPRA) 1000 MG tablet; TAKE 1 TABLET (1,000 MG TOTAL) BY MOUTH 2 (TWO) TIMES DAILY.  Dispense: 60 tablet; Refill: 1  -     lorazepam (ATIVAN) 1 MG tablet; Take 1 tablet (1 mg total) by mouth every 8 (eight) hours as needed for Anxiety.  Dispense: 60 tablet; Refill: 0      Schizophrenia, unspecified type  Schedule psych appointment  -     Hepatitis C antibody; Future; Expected date: 06/27/2017  -     Ambulatory referral to Outpatient Case Management    Valproic acid-induced tremor  -     benztropine (COGENTIN) 1 MG tablet; Take 1 tablet (1 mg total) by mouth once daily.  Dispense: 30 tablet; Refill: 1    Parkinsons disease  -     benztropine (COGENTIN) 1 MG tablet; Take 1 tablet (1 mg total) by mouth once daily.  Dispense: 30 tablet; Refill: 1    Undifferentiated schizophrenia  -     haloperidol (HALDOL) 1 MG tablet; Take 1 tablet (1 mg total) by mouth 3 (three) times daily.  Dispense: 90 tablet; Refill: 1  -     olanzapine (ZYPREXA) 5 MG tablet; Take 1 tablet (5 mg total) by mouth 2 (two) times daily.   Dispense: 60 tablet; Refill: 1    Arthropathy of right shoulder  -     meloxicam (MOBIC) 15 MG tablet; Take 1 tablet (15 mg total) by mouth daily as needed for Pain.  Dispense: 30 tablet; Refill: 5    Encounter for lipid screening for cardiovascular disease  -     Lipid panel; Future; Expected date: 06/27/2017    Encounter for hepatitis C screening test for low risk patient  -     HEPATITIS C ANTIBODY; Future; Expected date: 06/27/2017    Gastroesophageal reflux disease, esophagitis presence not specified  -     omeprazole (PRILOSEC) 20 MG capsule; Take 1 capsule (20 mg total) by mouth once daily.  Dispense: 90 capsule; Refill: 3    Other orders  -     propranolol (INDERAL) 10 MG tablet; Take 1 tablet (10 mg total) by mouth once daily at 6am.  Dispense: 90 tablet; Refill: 3    Had in depth conversation with mother about the importance of keeping Mr Spring on all of his medications continuously. Also reinforced need to see neuro and psych so they can make recommendations on his current medication regimen/ treatment plan . Mother and pt verbalized understanding. Hoping case management can help facilitate these visits and keep help his mother find a routine for his medication administration.   involvement of care signed with mother who is his primary care taker  Pt may need home health if mother continues to be unable to provide adequate support/care to Mr Spring due to her own health issues.   **

## 2017-06-29 ENCOUNTER — LAB VISIT (OUTPATIENT)
Dept: LAB | Facility: HOSPITAL | Age: 57
End: 2017-06-29
Attending: NURSE PRACTITIONER
Payer: MEDICARE

## 2017-06-29 DIAGNOSIS — Z13.220 ENCOUNTER FOR LIPID SCREENING FOR CARDIOVASCULAR DISEASE: ICD-10-CM

## 2017-06-29 DIAGNOSIS — R56.9 SEIZURES: Chronic | ICD-10-CM

## 2017-06-29 DIAGNOSIS — Z13.6 ENCOUNTER FOR LIPID SCREENING FOR CARDIOVASCULAR DISEASE: ICD-10-CM

## 2017-06-29 DIAGNOSIS — Z11.59 ENCOUNTER FOR HEPATITIS C SCREENING TEST FOR LOW RISK PATIENT: ICD-10-CM

## 2017-06-29 LAB
CHOLEST/HDLC SERPL: 4.2 {RATIO}
HDL/CHOLESTEROL RATIO: 23.7 %
HDLC SERPL-MCNC: 131 MG/DL
HDLC SERPL-MCNC: 31 MG/DL
LDLC SERPL CALC-MCNC: 85.4 MG/DL
NONHDLC SERPL-MCNC: 100 MG/DL
TRIGL SERPL-MCNC: 73 MG/DL
VALPROATE SERPL-MCNC: 70.6 UG/ML

## 2017-06-29 PROCEDURE — 80061 LIPID PANEL: CPT

## 2017-06-29 PROCEDURE — 80177 DRUG SCRN QUAN LEVETIRACETAM: CPT

## 2017-06-29 PROCEDURE — 86803 HEPATITIS C AB TEST: CPT

## 2017-06-29 PROCEDURE — 80164 ASSAY DIPROPYLACETIC ACD TOT: CPT

## 2017-06-29 PROCEDURE — 80299 QUANTITATIVE ASSAY DRUG: CPT

## 2017-06-29 PROCEDURE — 36415 COLL VENOUS BLD VENIPUNCTURE: CPT | Mod: PO

## 2017-06-30 LAB — HCV AB SERPL QL IA: NEGATIVE

## 2017-07-02 LAB — LACOSAMIDE: <0.5 MCG/ML

## 2017-07-03 ENCOUNTER — OUTPATIENT CASE MANAGEMENT (OUTPATIENT)
Dept: ADMINISTRATIVE | Facility: OTHER | Age: 57
End: 2017-07-03

## 2017-07-03 NOTE — PROGRESS NOTES
Thank you for the referral.  Patient has been assigned to MILENA Dickson,  for low risk screening for Outpatient Case Management.     Reason for referral: Low risk    Seizures  Schizophrenia, unspecified type    Thank you,    Shaista Ghotra, SSC

## 2017-07-04 ENCOUNTER — HOSPITAL ENCOUNTER (EMERGENCY)
Facility: HOSPITAL | Age: 57
Discharge: HOME | End: 2017-07-04
Attending: EMERGENCY MEDICINE
Payer: MEDICARE

## 2017-07-04 VITALS
WEIGHT: 150 LBS | BODY MASS INDEX: 21.52 KG/M2 | TEMPERATURE: 98 F | SYSTOLIC BLOOD PRESSURE: 160 MMHG | DIASTOLIC BLOOD PRESSURE: 75 MMHG | RESPIRATION RATE: 18 BRPM | OXYGEN SATURATION: 98 % | HEART RATE: 60 BPM

## 2017-07-04 DIAGNOSIS — R56.9 SEIZURE: Primary | ICD-10-CM

## 2017-07-04 PROCEDURE — 25000003 PHARM REV CODE 250: Performed by: EMERGENCY MEDICINE

## 2017-07-04 PROCEDURE — 99284 EMERGENCY DEPT VISIT MOD MDM: CPT

## 2017-07-04 RX ORDER — LORAZEPAM 1 MG/1
2 TABLET ORAL
Status: COMPLETED | OUTPATIENT
Start: 2017-07-04 | End: 2017-07-04

## 2017-07-04 RX ORDER — LEVETIRACETAM 500 MG/1
1000 TABLET ORAL
Status: COMPLETED | OUTPATIENT
Start: 2017-07-04 | End: 2017-07-04

## 2017-07-04 RX ORDER — DIVALPROEX SODIUM 500 MG/1
500 TABLET, DELAYED RELEASE ORAL
Status: COMPLETED | OUTPATIENT
Start: 2017-07-04 | End: 2017-07-04

## 2017-07-04 RX ADMIN — LEVETIRACETAM 1000 MG: 500 TABLET ORAL at 08:07

## 2017-07-04 RX ADMIN — DIVALPROEX SODIUM 500 MG: 500 TABLET, DELAYED RELEASE ORAL at 08:07

## 2017-07-04 RX ADMIN — LORAZEPAM 2 MG: 1 TABLET ORAL at 08:07

## 2017-07-07 ENCOUNTER — OFFICE VISIT (OUTPATIENT)
Dept: INTERNAL MEDICINE | Facility: CLINIC | Age: 57
End: 2017-07-07
Payer: MEDICARE

## 2017-07-07 VITALS
BODY MASS INDEX: 20.46 KG/M2 | SYSTOLIC BLOOD PRESSURE: 130 MMHG | RESPIRATION RATE: 18 BRPM | TEMPERATURE: 98 F | WEIGHT: 142.88 LBS | DIASTOLIC BLOOD PRESSURE: 70 MMHG | OXYGEN SATURATION: 97 % | HEART RATE: 82 BPM | HEIGHT: 70 IN

## 2017-07-07 DIAGNOSIS — F20.3 UNDIFFERENTIATED SCHIZOPHRENIA: Chronic | ICD-10-CM

## 2017-07-07 DIAGNOSIS — R56.9 SEIZURES: Primary | Chronic | ICD-10-CM

## 2017-07-07 PROCEDURE — 99213 OFFICE O/P EST LOW 20 MIN: CPT | Mod: S$GLB,,, | Performed by: FAMILY MEDICINE

## 2017-07-07 PROCEDURE — 99999 PR PBB SHADOW E&M-EST. PATIENT-LVL III: CPT | Mod: PBBFAC,,, | Performed by: FAMILY MEDICINE

## 2017-07-07 NOTE — PROGRESS NOTES
Subjective:       Patient ID: Smooth Spring is a 57 y.o. male.    Chief Complaint: Follow-up    HPI  Mr. Spring is here today with his mother and other family member who help to care for him.  Since the last visit he is back on all of his medication and doing well.  He has not had any seizures since the last time he was seen here at the clinic.  His mother says that [facility].  He also has a neurology appointment on September 1 with Dr. Dong.    Family History   Problem Relation Age of Onset    Kidney disease Mother     Diabetes Mother        Current Outpatient Prescriptions:     benztropine (COGENTIN) 1 MG tablet, Take 1 tablet (1 mg total) by mouth once daily., Disp: 30 tablet, Rfl: 1    divalproex ER (DEPAKOTE ER) 250 MG 24 hr tablet, Take 1 tablet (250 mg total) by mouth 2 (two) times daily., Disp: 60 tablet, Rfl: 1    haloperidol (HALDOL) 1 MG tablet, Take 1 tablet (1 mg total) by mouth 3 (three) times daily., Disp: 90 tablet, Rfl: 1    lacosamide (VIMPAT) 100 mg Tab, Take 1 tablet (100 mg total) by mouth 2 (two) times daily., Disp: 60 tablet, Rfl: 1    levetiracetam (KEPPRA) 1000 MG tablet, TAKE 1 TABLET (1,000 MG TOTAL) BY MOUTH 2 (TWO) TIMES DAILY., Disp: 60 tablet, Rfl: 1    lorazepam (ATIVAN) 1 MG tablet, Take 1 tablet (1 mg total) by mouth every 8 (eight) hours as needed for Anxiety., Disp: 60 tablet, Rfl: 0    meloxicam (MOBIC) 15 MG tablet, Take 1 tablet (15 mg total) by mouth daily as needed for Pain., Disp: 30 tablet, Rfl: 5    olanzapine (ZYPREXA) 5 MG tablet, Take 1 tablet (5 mg total) by mouth 2 (two) times daily., Disp: 60 tablet, Rfl: 1    omeprazole (PRILOSEC) 20 MG capsule, Take 1 capsule (20 mg total) by mouth once daily., Disp: 90 capsule, Rfl: 3    propranolol (INDERAL) 10 MG tablet, Take 1 tablet (10 mg total) by mouth once daily at 6am., Disp: 90 tablet, Rfl: 3    Review of Systems   Constitutional: Negative  "for chills and fever.   Eyes: Negative for visual disturbance.   Respiratory: Negative for cough and shortness of breath.    Cardiovascular: Negative for chest pain.   Gastrointestinal: Negative for abdominal pain.   Neurological: Positive for tremors. Negative for dizziness and seizures.       Objective:   /70 (BP Location: Left arm, Patient Position: Sitting, BP Method: Automatic)   Pulse 82   Temp 97.6 °F (36.4 °C) (Tympanic)   Resp 18   Ht 5' 10" (1.778 m)   Wt 64.8 kg (142 lb 13.7 oz)   SpO2 97%   BMI 20.50 kg/m²      Physical Exam   Constitutional: He is oriented to person, place, and time. He appears well-developed and well-nourished.   HENT:   Head: Normocephalic and atraumatic.   Eyes: Conjunctivae are normal.   Cardiovascular: Normal rate.    Pulmonary/Chest: Effort normal. No respiratory distress.   Musculoskeletal: He exhibits no edema.   Neurological: He is alert and oriented to person, place, and time. Coordination normal.   Tremor of upper extremity   Skin: Skin is warm and dry. No rash noted.   Psychiatric: He has a normal mood and affect. His behavior is normal.   Pleasant. Interactive.    Vitals reviewed.      Assessment & Plan     1. Seizures  No new seizure activity since being back on all of his medications.  His levels were drawn about 8 days ago and were normal except for the Vimpat.  Seems to be no signs of super therapeutic dosing.  Maintain follow-up with neurology. emphasized the importance of this.    2. Undifferentiated schizophrenia  Overall stable.  [facility]      "

## 2017-07-10 ENCOUNTER — OUTPATIENT CASE MANAGEMENT (OUTPATIENT)
Dept: ADMINISTRATIVE | Facility: OTHER | Age: 57
End: 2017-07-10

## 2017-07-10 NOTE — PROGRESS NOTES
This CSW received a referral on the above patient.   Reason for referral:Seizures  Name of the community resource that was provided:[employer] , [facility] [employer]  Resource given to: Patient mom via Telephone    This CSW completed assessment with patient caregiver/[place]. Caregiver reports patient needs assistance with his ADL's . CSW ask if patient was a [place]. Caregiver reports patient served in the [employer] and served during the [place] war . CSW referred patient to [employer] to assist with non medical services. [facility] Caregiver also reports patient has a neurology consult on 09/01/2017. Caregiver completed PHQ screening on behalf of patient . PHQ score is a 19. Caregiver reports no indication of Homicidal or Suicidal thoughts. CSW mailed [employer] transportation resource to patient home. Caregiver reports she has a pill box to keep track of patient medication. Caregiver reports she knows what time and what medication to administer to the patient.

## 2017-07-14 LAB — LEVETIRACETAM SERPL-MCNC: 28.8 UG/ML (ref 3–60)

## 2017-07-31 DIAGNOSIS — R56.9 SEIZURES: Chronic | ICD-10-CM

## 2017-07-31 RX ORDER — LEVETIRACETAM 1000 MG/1
TABLET ORAL
Qty: 60 TABLET | Refills: 0 | Status: SHIPPED | OUTPATIENT
Start: 2017-07-31 | End: 2017-09-15 | Stop reason: SDUPTHER

## 2017-08-22 DIAGNOSIS — R56.9 SEIZURES: Chronic | ICD-10-CM

## 2017-08-23 RX ORDER — LACOSAMIDE 100 MG/1
TABLET, FILM COATED ORAL
Qty: 60 TABLET | Refills: 1 | Status: SHIPPED | OUTPATIENT
Start: 2017-08-23 | End: 2017-09-15 | Stop reason: SDUPTHER

## 2017-08-25 DIAGNOSIS — M19.011 ARTHROPATHY OF RIGHT SHOULDER: ICD-10-CM

## 2017-08-25 RX ORDER — MELOXICAM 15 MG/1
15 TABLET ORAL DAILY PRN
Qty: 30 TABLET | Refills: 5 | Status: SHIPPED | OUTPATIENT
Start: 2017-08-25 | End: 2018-02-10 | Stop reason: SDUPTHER

## 2017-08-26 DIAGNOSIS — R56.9 SEIZURES: Chronic | ICD-10-CM

## 2017-08-26 DIAGNOSIS — G25.1 VALPROIC ACID-INDUCED TREMOR: Chronic | ICD-10-CM

## 2017-08-26 DIAGNOSIS — G20.A1 PARKINSONS DISEASE: ICD-10-CM

## 2017-08-26 DIAGNOSIS — T42.6X5A VALPROIC ACID-INDUCED TREMOR: Chronic | ICD-10-CM

## 2017-08-28 RX ORDER — DIVALPROEX SODIUM 250 MG/1
250 TABLET, FILM COATED, EXTENDED RELEASE ORAL 2 TIMES DAILY
Qty: 60 TABLET | Refills: 1 | Status: SHIPPED | OUTPATIENT
Start: 2017-08-28 | End: 2017-10-28 | Stop reason: SDUPTHER

## 2017-08-28 RX ORDER — BENZTROPINE MESYLATE 1 MG/1
TABLET ORAL
Qty: 30 TABLET | Refills: 1 | Status: SHIPPED | OUTPATIENT
Start: 2017-08-28 | End: 2017-10-28 | Stop reason: SDUPTHER

## 2017-09-15 DIAGNOSIS — R56.9 SEIZURES: Chronic | ICD-10-CM

## 2017-09-18 ENCOUNTER — TELEPHONE (OUTPATIENT)
Dept: INTERNAL MEDICINE | Facility: CLINIC | Age: 57
End: 2017-09-18

## 2017-09-18 DIAGNOSIS — R56.9 SEIZURES: Chronic | ICD-10-CM

## 2017-09-18 RX ORDER — LACOSAMIDE 100 MG/1
TABLET, FILM COATED ORAL
Qty: 60 TABLET | Refills: 1 | OUTPATIENT
Start: 2017-09-18

## 2017-09-18 RX ORDER — LEVETIRACETAM 1000 MG/1
TABLET ORAL
Qty: 60 TABLET | Refills: 0 | Status: SHIPPED | OUTPATIENT
Start: 2017-09-18 | End: 2017-10-25 | Stop reason: SDUPTHER

## 2017-09-18 RX ORDER — LACOSAMIDE 100 MG/1
TABLET, FILM COATED ORAL
Qty: 60 TABLET | Refills: 0 | Status: SHIPPED | OUTPATIENT
Start: 2017-09-18 | End: 2017-10-28 | Stop reason: SDUPTHER

## 2017-09-18 NOTE — TELEPHONE ENCOUNTER
----- Message from PATRICIA Mota,FNP-C sent at 9/18/2017  8:10 AM CDT -----  Pt requested refills. Pt is due for a follow up. 30 days supply was sent to pharmacy. Pt will need to f/u with Dr Beltre or PCP for additional refills. Please help pt schedule. Also, pt HAS to est care with psych. Has he seen a neurologist yet?

## 2017-09-21 NOTE — TELEPHONE ENCOUNTER
Called and spoke with patient father,who states [facility] has [facility]. Will call after that appointment schedule with Dr. Beltre

## 2017-10-13 DIAGNOSIS — R56.9 SEIZURES: Chronic | ICD-10-CM

## 2017-10-16 DIAGNOSIS — R56.9 SEIZURES: Chronic | ICD-10-CM

## 2017-10-16 RX ORDER — LEVETIRACETAM 1000 MG/1
TABLET ORAL
Qty: 60 TABLET | Refills: 0 | OUTPATIENT
Start: 2017-10-16

## 2017-10-16 RX ORDER — LACOSAMIDE 100 MG/1
TABLET, FILM COATED ORAL
Qty: 60 TABLET | Refills: 0 | OUTPATIENT
Start: 2017-10-16

## 2017-10-16 NOTE — TELEPHONE ENCOUNTER
Spoke with pt and informed him before we can refill his meds he needs to come in for an appt. Pt verbalized understanding. He made his appt for this Thursday 10/19/17 at 11:40 with . He could not come any other day because he has dialysis. Pt has 4 pills left. Enough to last him until his appt Thursday.

## 2017-10-16 NOTE — TELEPHONE ENCOUNTER
Pt needs appt for refills. Please let me know when he scheduled appt and will refill only for that period of time (days until appt)

## 2017-10-19 ENCOUNTER — TELEPHONE (OUTPATIENT)
Dept: INTERNAL MEDICINE | Facility: CLINIC | Age: 57
End: 2017-10-19

## 2017-10-19 NOTE — TELEPHONE ENCOUNTER
Tried calling pt to let him know that he missed his appt with  for 11:20 today. That he can call back to reschedule at [phone]. Whom ever answered said I had the wrong number when I asked for the pt. I verified the phone number.

## 2017-10-25 DIAGNOSIS — R56.9 SEIZURES: Chronic | ICD-10-CM

## 2017-10-26 RX ORDER — LEVETIRACETAM 1000 MG/1
TABLET ORAL
Qty: 60 TABLET | Refills: 0 | Status: SHIPPED | OUTPATIENT
Start: 2017-10-26 | End: 2017-11-20 | Stop reason: SDUPTHER

## 2017-10-28 DIAGNOSIS — G25.1 VALPROIC ACID-INDUCED TREMOR: Chronic | ICD-10-CM

## 2017-10-28 DIAGNOSIS — G20.A1 PARKINSONS DISEASE: ICD-10-CM

## 2017-10-28 DIAGNOSIS — T42.6X5A VALPROIC ACID-INDUCED TREMOR: Chronic | ICD-10-CM

## 2017-10-28 DIAGNOSIS — R56.9 SEIZURES: Chronic | ICD-10-CM

## 2017-10-30 RX ORDER — LACOSAMIDE 100 MG/1
TABLET, FILM COATED ORAL
Qty: 60 TABLET | Refills: 0 | Status: SHIPPED | OUTPATIENT
Start: 2017-10-30 | End: 2017-11-27 | Stop reason: SDUPTHER

## 2017-10-30 RX ORDER — BENZTROPINE MESYLATE 1 MG/1
TABLET ORAL
Qty: 30 TABLET | Refills: 1 | Status: SHIPPED | OUTPATIENT
Start: 2017-10-30 | End: 2018-02-09

## 2017-10-30 RX ORDER — DIVALPROEX SODIUM 250 MG/1
250 TABLET, FILM COATED, EXTENDED RELEASE ORAL 2 TIMES DAILY
Qty: 60 TABLET | Refills: 0 | Status: SHIPPED | OUTPATIENT
Start: 2017-10-30 | End: 2017-12-06 | Stop reason: SDUPTHER

## 2017-10-30 NOTE — TELEPHONE ENCOUNTER
Will not continue to refill medications without visit after this 30 day supply. Pt MUST come see MD

## 2017-10-30 NOTE — TELEPHONE ENCOUNTER
Left msg for pt that his Rx's were sent to his pharmacy but will no be refilled again until he males an appt to see a doctor. To call us at [phone] to make an appt.

## 2017-11-17 DIAGNOSIS — R56.9 SEIZURES: Chronic | ICD-10-CM

## 2017-11-17 RX ORDER — DIVALPROEX SODIUM 250 MG/1
250 TABLET, FILM COATED, EXTENDED RELEASE ORAL 2 TIMES DAILY
Qty: 60 TABLET | Refills: 0 | OUTPATIENT
Start: 2017-11-17

## 2017-11-17 NOTE — TELEPHONE ENCOUNTER
This pt has been repeatedly told they need an appointment for refills. Please call and schedule him ASAP. Will refill only for the number of days needed until appt so please let me know when he is scheduled. [facility] He also was to see psych and neuro, has he done this? His mother is his care taker, you will likely have to speak with her

## 2017-11-20 ENCOUNTER — OFFICE VISIT (OUTPATIENT)
Dept: INTERNAL MEDICINE | Facility: CLINIC | Age: 57
End: 2017-11-20
Payer: MEDICARE

## 2017-11-20 VITALS
HEIGHT: 68 IN | WEIGHT: 142.88 LBS | OXYGEN SATURATION: 96 % | SYSTOLIC BLOOD PRESSURE: 120 MMHG | TEMPERATURE: 96 F | BODY MASS INDEX: 21.65 KG/M2 | HEART RATE: 72 BPM | DIASTOLIC BLOOD PRESSURE: 58 MMHG | RESPIRATION RATE: 16 BRPM

## 2017-11-20 DIAGNOSIS — F20.3 UNDIFFERENTIATED SCHIZOPHRENIA: Chronic | ICD-10-CM

## 2017-11-20 DIAGNOSIS — K21.9 GASTROESOPHAGEAL REFLUX DISEASE, ESOPHAGITIS PRESENCE NOT SPECIFIED: ICD-10-CM

## 2017-11-20 DIAGNOSIS — T30.0 BURN: ICD-10-CM

## 2017-11-20 DIAGNOSIS — R56.9 SEIZURES: Chronic | ICD-10-CM

## 2017-11-20 PROBLEM — Z79.899 ENCOUNTER FOR LONG-TERM (CURRENT) USE OF MEDICATIONS: Status: ACTIVE | Noted: 2017-11-20

## 2017-11-20 PROCEDURE — 99215 OFFICE O/P EST HI 40 MIN: CPT | Mod: S$GLB,,, | Performed by: FAMILY MEDICINE

## 2017-11-20 PROCEDURE — 99999 PR PBB SHADOW E&M-EST. PATIENT-LVL IV: CPT | Mod: PBBFAC,,, | Performed by: FAMILY MEDICINE

## 2017-11-20 RX ORDER — MUPIROCIN 20 MG/G
OINTMENT TOPICAL 3 TIMES DAILY
Qty: 30 G | Refills: 0 | Status: SHIPPED | OUTPATIENT
Start: 2017-11-20 | End: 2018-02-09

## 2017-11-20 RX ORDER — OMEPRAZOLE 20 MG/1
20 CAPSULE, DELAYED RELEASE ORAL DAILY
Qty: 90 CAPSULE | Refills: 3 | Status: SHIPPED | OUTPATIENT
Start: 2017-11-20 | End: 2018-02-06 | Stop reason: SDUPTHER

## 2017-11-20 RX ORDER — HALOPERIDOL 1 MG/1
5 TABLET ORAL 2 TIMES DAILY
Qty: 90 TABLET | Refills: 0 | Status: SHIPPED | OUTPATIENT
Start: 2017-11-20 | End: 2018-05-03 | Stop reason: SDUPTHER

## 2017-11-20 RX ORDER — LEVETIRACETAM 1000 MG/1
TABLET ORAL
Qty: 60 TABLET | Refills: 0 | Status: SHIPPED | OUTPATIENT
Start: 2017-11-20 | End: 2017-11-27 | Stop reason: SDUPTHER

## 2017-11-20 RX ORDER — PROPRANOLOL HYDROCHLORIDE 10 MG/1
10 TABLET ORAL DAILY
Qty: 90 TABLET | Refills: 3 | Status: SHIPPED | OUTPATIENT
Start: 2017-11-20 | End: 2018-02-06 | Stop reason: SDUPTHER

## 2017-11-20 NOTE — LETTER
November 22, 2017      No Recipients           [facility]  [address] [address] [address]  Phone: [phone]          Patient: Smooth Spring   MR Number: [id number]   YOB: 1960   Date of Visit: 11/20/2017       Dear :    Thank you for referring Smooth Spring to me for evaluation. Attached you will find relevant portions of my assessment and plan of care.    If you have questions, please do not hesitate to call me. I look forward to following Smooth Spring along with you.    Sincerely,    Sumeet Koo MD    Enclosure  CC:  No Recipients    If you would like to receive this communication electronically, please contact [email or web address] or [phone] to request more information on [employer] access.    For providers and/or their staff who would like to refer a patient to [facility], please contact us through our one-stop-shop provider referral line, [facility], at [phone].    If you feel you have received this communication in error or would no longer like to receive these types of communications, please e-mail [email or web address]

## 2017-11-23 PROBLEM — T30.0 BURN: Status: ACTIVE | Noted: 2017-11-23

## 2017-11-23 PROBLEM — K21.9 GASTROESOPHAGEAL REFLUX DISEASE: Status: ACTIVE | Noted: 2017-11-23
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
## 2017-11-27 DIAGNOSIS — R56.9 SEIZURES: Chronic | ICD-10-CM

## 2017-11-27 RX ORDER — LACOSAMIDE 100 MG/1
TABLET, FILM COATED ORAL
Qty: 60 TABLET | Refills: 0 | Status: SHIPPED | OUTPATIENT
Start: 2017-11-27 | End: 2018-01-02 | Stop reason: SDUPTHER

## 2017-11-27 RX ORDER — LEVETIRACETAM 1000 MG/1
TABLET ORAL
Qty: 60 TABLET | Refills: 0 | Status: SHIPPED | OUTPATIENT
Start: 2017-11-27 | End: 2017-12-24 | Stop reason: SDUPTHER

## 2017-11-28 ENCOUNTER — PATIENT OUTREACH (OUTPATIENT)
Dept: ADMINISTRATIVE | Facility: HOSPITAL | Age: 57
End: 2017-11-28

## 2017-12-05 ENCOUNTER — OFFICE VISIT (OUTPATIENT)
Dept: INTERNAL MEDICINE | Facility: CLINIC | Age: 57
End: 2017-12-05
Payer: MEDICARE

## 2017-12-05 VITALS
BODY MASS INDEX: 21.62 KG/M2 | WEIGHT: 142.63 LBS | OXYGEN SATURATION: 98 % | DIASTOLIC BLOOD PRESSURE: 80 MMHG | HEIGHT: 68 IN | RESPIRATION RATE: 18 BRPM | HEART RATE: 64 BPM | TEMPERATURE: 96 F | SYSTOLIC BLOOD PRESSURE: 140 MMHG

## 2017-12-05 DIAGNOSIS — Z12.11 SCREEN FOR COLON CANCER: ICD-10-CM

## 2017-12-05 DIAGNOSIS — R56.9 SEIZURES: Chronic | ICD-10-CM

## 2017-12-05 DIAGNOSIS — T30.0 BURN: Primary | ICD-10-CM

## 2017-12-05 DIAGNOSIS — I10 ESSENTIAL HYPERTENSION: ICD-10-CM

## 2017-12-05 DIAGNOSIS — Z28.9 DELAYED IMMUNIZATIONS: ICD-10-CM

## 2017-12-05 PROCEDURE — G0008 ADMIN INFLUENZA VIRUS VAC: HCPCS | Mod: S$GLB,,, | Performed by: FAMILY MEDICINE

## 2017-12-05 PROCEDURE — 90686 IIV4 VACC NO PRSV 0.5 ML IM: CPT | Mod: S$GLB,,, | Performed by: FAMILY MEDICINE

## 2017-12-05 PROCEDURE — 90732 PPSV23 VACC 2 YRS+ SUBQ/IM: CPT | Mod: S$GLB,,, | Performed by: FAMILY MEDICINE

## 2017-12-05 PROCEDURE — 99214 OFFICE O/P EST MOD 30 MIN: CPT | Mod: 25,S$GLB,, | Performed by: FAMILY MEDICINE

## 2017-12-05 PROCEDURE — G0009 ADMIN PNEUMOCOCCAL VACCINE: HCPCS | Mod: S$GLB,,, | Performed by: FAMILY MEDICINE

## 2017-12-05 PROCEDURE — 99999 PR PBB SHADOW E&M-EST. PATIENT-LVL V: CPT | Mod: PBBFAC,,, | Performed by: FAMILY MEDICINE

## 2017-12-05 RX ORDER — AMLODIPINE BESYLATE 10 MG/1
10 TABLET ORAL DAILY
Qty: 90 TABLET | Refills: 0 | Status: SHIPPED | OUTPATIENT
Start: 2017-12-05 | End: 2018-02-06 | Stop reason: SDUPTHER
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
## 2017-12-06 DIAGNOSIS — R56.9 SEIZURES: Chronic | ICD-10-CM

## 2017-12-06 RX ORDER — DIVALPROEX SODIUM 250 MG/1
250 TABLET, FILM COATED, EXTENDED RELEASE ORAL 2 TIMES DAILY
Qty: 60 TABLET | Refills: 0 | Status: SHIPPED | OUTPATIENT
Start: 2017-12-06 | End: 2018-01-02 | Stop reason: SDUPTHER

## 2017-12-07 ENCOUNTER — PATIENT OUTREACH (OUTPATIENT)
Dept: ADMINISTRATIVE | Facility: HOSPITAL | Age: 57
End: 2017-12-07

## 2017-12-12 ENCOUNTER — OFFICE VISIT (OUTPATIENT)
Dept: INTERNAL MEDICINE | Facility: CLINIC | Age: 57
End: 2017-12-12
Payer: MEDICARE

## 2017-12-12 ENCOUNTER — HOSPITAL ENCOUNTER (OUTPATIENT)
Dept: RADIOLOGY | Facility: HOSPITAL | Age: 57
Discharge: HOME | End: 2017-12-12
Attending: FAMILY MEDICINE
Payer: MEDICARE

## 2017-12-12 VITALS
RESPIRATION RATE: 18 BRPM | SYSTOLIC BLOOD PRESSURE: 124 MMHG | BODY MASS INDEX: 22.29 KG/M2 | HEART RATE: 86 BPM | OXYGEN SATURATION: 94 % | HEIGHT: 67 IN | TEMPERATURE: 97 F | DIASTOLIC BLOOD PRESSURE: 70 MMHG | WEIGHT: 142 LBS

## 2017-12-12 DIAGNOSIS — G89.29 CHRONIC RIGHT SHOULDER PAIN: ICD-10-CM

## 2017-12-12 DIAGNOSIS — F20.3 UNDIFFERENTIATED SCHIZOPHRENIA: Chronic | ICD-10-CM

## 2017-12-12 DIAGNOSIS — M25.511 CHRONIC RIGHT SHOULDER PAIN: ICD-10-CM

## 2017-12-12 DIAGNOSIS — T30.0 BURN: Primary | ICD-10-CM

## 2017-12-12 DIAGNOSIS — Z12.11 SCREEN FOR COLON CANCER: ICD-10-CM

## 2017-12-12 DIAGNOSIS — I10 ESSENTIAL HYPERTENSION: ICD-10-CM

## 2017-12-12 PROCEDURE — 99999 PR PBB SHADOW E&M-EST. PATIENT-LVL IV: CPT | Mod: PBBFAC,,, | Performed by: FAMILY MEDICINE

## 2017-12-12 PROCEDURE — 73030 X-RAY EXAM OF SHOULDER: CPT | Mod: TC,PO,RT

## 2017-12-12 PROCEDURE — 73030 X-RAY EXAM OF SHOULDER: CPT | Mod: 26,RT,, | Performed by: RADIOLOGY

## 2017-12-12 PROCEDURE — 99214 OFFICE O/P EST MOD 30 MIN: CPT | Mod: S$GLB,,, | Performed by: FAMILY MEDICINE
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
## 2017-12-19 ENCOUNTER — TELEPHONE (OUTPATIENT)
Dept: INTERNAL MEDICINE | Facility: CLINIC | Age: 57
End: 2017-12-19

## 2017-12-19 DIAGNOSIS — M19.011 ARTHROPATHY OF RIGHT SHOULDER: Primary | ICD-10-CM

## 2017-12-19 NOTE — TELEPHONE ENCOUNTER
Spoke with Lakeisha at [facility]. She asked if we could send over a new order for the pt for occupation therapy instead of physical therapy. Please advise.     Fax# [phone]

## 2017-12-19 NOTE — TELEPHONE ENCOUNTER
----- Message from Caleb Gomes sent at 12/19/2017  2:41 PM CST -----  Contact: Physical Therapy (Ladonna)  Caller states that a order was sent over for physical therapy and needs to speak with nurse regarding order. Please give Ladonna a call at [phone]

## 2017-12-20 ENCOUNTER — TELEPHONE (OUTPATIENT)
Dept: INTERNAL MEDICINE | Facility: CLINIC | Age: 57
End: 2017-12-20

## 2017-12-20 NOTE — TELEPHONE ENCOUNTER
----- Message from Emma Lindsay sent at 12/20/2017  1:40 PM CST -----  Contact: [facility]  [facility] was sent an order for one patient and specimen from another patient. Please call back at  ext [phone] from [place] ....    Second   [id number]      Thanks,  Emma Lindsay

## 2017-12-20 NOTE — TELEPHONE ENCOUNTER
Lab received speciman with wrong order will need to repeat. Patient notified that will need to repeat

## 2017-12-24 DIAGNOSIS — R56.9 SEIZURES: Chronic | ICD-10-CM

## 2017-12-26 RX ORDER — LEVETIRACETAM 1000 MG/1
TABLET ORAL
Qty: 60 TABLET | Refills: 0 | Status: SHIPPED | OUTPATIENT
Start: 2017-12-26 | End: 2018-01-23 | Stop reason: SDUPTHER

## 2018-01-02 DIAGNOSIS — R56.9 SEIZURES: Chronic | ICD-10-CM

## 2018-01-02 RX ORDER — DIVALPROEX SODIUM 250 MG/1
250 TABLET, FILM COATED, EXTENDED RELEASE ORAL 2 TIMES DAILY
Qty: 60 TABLET | Refills: 0 | Status: SHIPPED | OUTPATIENT
Start: 2018-01-02 | End: 2018-02-10 | Stop reason: SDUPTHER

## 2018-01-02 RX ORDER — LACOSAMIDE 100 MG/1
TABLET, FILM COATED ORAL
Qty: 60 TABLET | Refills: 0 | Status: SHIPPED | OUTPATIENT
Start: 2018-01-02 | End: 2018-01-22 | Stop reason: SDUPTHER

## 2018-01-22 DIAGNOSIS — R56.9 SEIZURES: Chronic | ICD-10-CM

## 2018-01-22 RX ORDER — LACOSAMIDE 100 MG/1
TABLET, FILM COATED ORAL
Qty: 60 TABLET | Refills: 0 | Status: SHIPPED | OUTPATIENT
Start: 2018-01-22 | End: 2018-01-29 | Stop reason: SDUPTHER

## 2018-01-23 ENCOUNTER — PATIENT OUTREACH (OUTPATIENT)
Dept: ADMINISTRATIVE | Facility: HOSPITAL | Age: 58
End: 2018-01-23

## 2018-01-23 DIAGNOSIS — R56.9 SEIZURES: Chronic | ICD-10-CM

## 2018-01-23 RX ORDER — LEVETIRACETAM 1000 MG/1
TABLET ORAL
Qty: 60 TABLET | Refills: 0 | Status: SHIPPED | OUTPATIENT
Start: 2018-01-23 | End: 2018-02-09

## 2018-01-29 DIAGNOSIS — R56.9 SEIZURES: Chronic | ICD-10-CM

## 2018-01-29 RX ORDER — LACOSAMIDE 100 MG/1
1 TABLET ORAL 2 TIMES DAILY
Qty: 60 TABLET | Refills: 0 | Status: SHIPPED | OUTPATIENT
Start: 2018-01-29 | End: 2018-05-03 | Stop reason: SDUPTHER

## 2018-01-29 RX ORDER — LACOSAMIDE 100 MG/1
TABLET, FILM COATED ORAL
Qty: 60 TABLET | Refills: 0 | OUTPATIENT
Start: 2018-01-29

## 2018-01-29 NOTE — TELEPHONE ENCOUNTER
This pt has a follow up with you in 2 weeks, that is why I sent the Rx to you for eval since I haven't seen him in quite some time. Does he need to come in earlier to get this refilled?

## 2018-02-06 ENCOUNTER — DOCUMENTATION ONLY (OUTPATIENT)
Dept: INTERNAL MEDICINE | Facility: CLINIC | Age: 58
End: 2018-02-06

## 2018-02-06 ENCOUNTER — LAB VISIT (OUTPATIENT)
Dept: LAB | Facility: HOSPITAL | Age: 58
End: 2018-02-06
Attending: FAMILY MEDICINE
Payer: MEDICARE

## 2018-02-06 ENCOUNTER — OFFICE VISIT (OUTPATIENT)
Dept: INTERNAL MEDICINE | Facility: CLINIC | Age: 58
End: 2018-02-06
Payer: MEDICARE

## 2018-02-06 VITALS
HEIGHT: 67 IN | TEMPERATURE: 97 F | WEIGHT: 150.13 LBS | HEART RATE: 64 BPM | BODY MASS INDEX: 23.56 KG/M2 | OXYGEN SATURATION: 99 % | DIASTOLIC BLOOD PRESSURE: 70 MMHG | RESPIRATION RATE: 16 BRPM | SYSTOLIC BLOOD PRESSURE: 110 MMHG

## 2018-02-06 DIAGNOSIS — F20.3 UNDIFFERENTIATED SCHIZOPHRENIA: Chronic | ICD-10-CM

## 2018-02-06 DIAGNOSIS — Z12.11 SCREEN FOR COLON CANCER: ICD-10-CM

## 2018-02-06 DIAGNOSIS — Z79.899 ENCOUNTER FOR LONG-TERM (CURRENT) USE OF MEDICATIONS: ICD-10-CM

## 2018-02-06 DIAGNOSIS — K21.9 GASTROESOPHAGEAL REFLUX DISEASE, ESOPHAGITIS PRESENCE NOT SPECIFIED: ICD-10-CM

## 2018-02-06 DIAGNOSIS — G20.A1 PARKINSONS DISEASE: ICD-10-CM

## 2018-02-06 DIAGNOSIS — R56.9 SEIZURES: Primary | Chronic | ICD-10-CM

## 2018-02-06 DIAGNOSIS — I10 ESSENTIAL HYPERTENSION: ICD-10-CM

## 2018-02-06 PROCEDURE — 99215 OFFICE O/P EST HI 40 MIN: CPT | Mod: S$GLB,,, | Performed by: FAMILY MEDICINE

## 2018-02-06 PROCEDURE — 3008F BODY MASS INDEX DOCD: CPT | Mod: S$GLB,,, | Performed by: FAMILY MEDICINE

## 2018-02-06 PROCEDURE — 36415 COLL VENOUS BLD VENIPUNCTURE: CPT | Mod: PO

## 2018-02-06 PROCEDURE — 99999 PR PBB SHADOW E&M-EST. PATIENT-LVL III: CPT | Mod: PBBFAC,,, | Performed by: FAMILY MEDICINE

## 2018-02-06 RX ORDER — OLANZAPINE 5 MG/1
5 TABLET ORAL 2 TIMES DAILY
Qty: 60 TABLET | Refills: 1 | Status: SHIPPED | OUTPATIENT
Start: 2018-02-06 | End: 2018-05-03 | Stop reason: SDUPTHER

## 2018-02-06 RX ORDER — PROPRANOLOL HYDROCHLORIDE 10 MG/1
10 TABLET ORAL DAILY
Qty: 90 TABLET | Refills: 3 | Status: SHIPPED | OUTPATIENT
Start: 2018-02-06 | End: 2019-03-12

## 2018-02-06 RX ORDER — AMLODIPINE BESYLATE 10 MG/1
10 TABLET ORAL DAILY
Qty: 90 TABLET | Refills: 0 | Status: SHIPPED | OUTPATIENT
Start: 2018-02-06 | End: 2018-05-03 | Stop reason: SDUPTHER

## 2018-02-06 RX ORDER — OMEPRAZOLE 20 MG/1
20 CAPSULE, DELAYED RELEASE ORAL DAILY
Qty: 90 CAPSULE | Refills: 3 | Status: SHIPPED | OUTPATIENT
Start: 2018-02-06 | End: 2018-02-09

## 2018-02-06 NOTE — PROGRESS NOTES
Subjective:       Patient ID: Smooth Spring is a 58 y.o. male.    Chief Complaint: Medicare AWV    Subjective:     Smooth Spring is a 58 y.o. male and is here for a comprehensive physical exam. The patient reports no problems.    Do you take any herbs or supplements that were not prescribed by a doctor? no  Are you taking calcium supplements? no  Are you taking aspirin daily? no     History:  Any STD's in the past? none    The following portions of the patient's history were reviewed and updated as appropriate: allergies, current medications, past family history, past medical history, past social history, past surgical history and problem list.    Review of Systems  Do you have pain that bothers you in your daily life? no  Pertinent items are noted in HPI.    Neuro    Seizures - Primary (Chronic)    Current Assessment & Plan     [facility]         Parkinsons disease    Current Assessment & Plan     [facility]. [facility]            Psychiatric    Schizophrenia (Chronic)    Current Assessment & Plan     [facility]. [facility]         Encounter for long-term (current) use of medications       Cardiac/Vascular    Essential hypertension       GI    Screen for colon cancer      2. Patient Counseling:  --Nutrition: Stressed importance of moderation in sodium/caffeine intake, saturated fat and cholesterol, caloric balance, sufficient intake of fresh fruits, vegetables, fiber, calcium, iron, and 1 mg of folate supplement per day (for females capable of pregnancy).  --Discussed the issue of estrogen replacement, calcium supplement, and the daily use of baby aspirin.  --Exercise: Stressed the importance of regular exercise.   --Substance Abuse: Discussed cessation/primary prevention of tobacco, alcohol, or other drug use; driving or other dangerous activities under the influence; availability of treatment for abuse.    --Sexuality: Discussed sexually transmitted diseases,  partner selection, use of condoms, avoidance of unintended pregnancy  and contraceptive alternatives.   --Injury prevention: Discussed safety belts, safety helmets, smoke detector, smoking near bedding or upholstery.   --Dental health: Discussed importance of regular tooth brushing, flossing, and dental visits.  --Immunizations reviewed.  --Discussed benefits of screening colonoscopy.  --After hours service discussed with patient    3. Discussed the patient's BMI with him.  The BMI WNL.  4. Follow up as needed for acute illness.        Review of Systems   Constitutional: Negative for activity change.   HENT: Negative for ear pain.    Eyes: Negative for pain.   Respiratory: Negative for shortness of breath.    Cardiovascular: Negative for chest pain.   Gastrointestinal: Negative for abdominal pain.   Genitourinary: Negative for dysuria.   Musculoskeletal: Negative for neck pain.   Skin: Negative for rash.   Neurological: Negative for headaches.       Objective:      Physical Exam   Constitutional: He appears well-developed and well-nourished. No distress.   HENT:   Head: Normocephalic and atraumatic.   Cardiovascular: Normal rate and regular rhythm.    Pulmonary/Chest: Effort normal and breath sounds normal. No respiratory distress. He has no wheezes.   Abdominal: Soft. Bowel sounds are normal. There is no tenderness.   Musculoskeletal: He exhibits no edema.   Neurological: He is alert.   Resting tremor to left hand.   Skin: Skin is warm and dry. No rash noted. He is not diaphoretic. No erythema.   Psychiatric: His affect is inappropriate. He expresses impulsivity and inappropriate judgment.   Sexually inappropriate comments toward his sitter in room.   Nursing note and vitals reviewed.      Assessment:       1. Seizures    2. Undifferentiated schizophrenia    3. Parkinsons disease    4. Encounter for long-term (current) use of medications    5. Screen for colon cancer    6. Essential hypertension    7.  Gastroesophageal reflux disease, esophagitis presence not specified        Plan:     Problem List Items Addressed This Visit        Neuro    Seizures - Primary (Chronic)    Current Assessment & Plan     [facility]. [facility]         Parkinsons disease    Current Assessment & Plan     [facility]. [facility]            Psychiatric    Schizophrenia (Chronic)    Current Assessment & Plan     [facility]         Relevant Medications    OLANZapine (ZYPREXA) 5 MG tablet    Encounter for long-term (current) use of medications    Relevant Orders    VALPROIC ACID    Lacosamide (Vimpat)    Levetiracetam level    TSH    CBC auto differential    Comprehensive metabolic panel    Hemoglobin A1c       Cardiac/Vascular    Essential hypertension    Relevant Medications    amLODIPine (NORVASC) 10 MG tablet    propranolol (INDERAL) 10 MG tablet       GI    Gastroesophageal reflux disease    Relevant Medications    omeprazole (PRILOSEC) 20 MG capsule    Screen for colon cancer    Relevant Orders    Fecal Immunochemical Test (iFOBT)

## 2018-02-08 ENCOUNTER — TELEPHONE (OUTPATIENT)
Dept: INTERNAL MEDICINE | Facility: CLINIC | Age: 58
End: 2018-02-08

## 2018-02-08 ENCOUNTER — APPOINTMENT (OUTPATIENT)
Dept: LAB | Facility: HOSPITAL | Age: 58
End: 2018-02-08
Attending: FAMILY MEDICINE
Payer: MEDICARE

## 2018-02-09 ENCOUNTER — HOSPITAL ENCOUNTER (EMERGENCY)
Facility: HOSPITAL | Age: 58
Discharge: HOME | End: 2018-02-09
Attending: EMERGENCY MEDICINE
Payer: MEDICARE

## 2018-02-09 VITALS
BODY MASS INDEX: 23.49 KG/M2 | DIASTOLIC BLOOD PRESSURE: 68 MMHG | TEMPERATURE: 98 F | SYSTOLIC BLOOD PRESSURE: 129 MMHG | WEIGHT: 150 LBS | OXYGEN SATURATION: 98 % | HEART RATE: 90 BPM | RESPIRATION RATE: 17 BRPM

## 2018-02-09 DIAGNOSIS — R56.9 SEIZURE: Primary | ICD-10-CM

## 2018-02-09 DIAGNOSIS — R56.9 SEIZURES: Chronic | ICD-10-CM

## 2018-02-09 LAB
ALBUMIN SERPL BCP-MCNC: 3.9 G/DL
ALP SERPL-CCNC: 94 U/L
ALT SERPL W/O P-5'-P-CCNC: 14 U/L
AMPHET+METHAMPHET UR QL: NEGATIVE
ANION GAP SERPL CALC-SCNC: 12 MMOL/L
AST SERPL-CCNC: 25 U/L
BARBITURATES UR QL SCN>200 NG/ML: NEGATIVE
BASOPHILS # BLD AUTO: 0.01 K/UL
BASOPHILS NFR BLD: 0.1 %
BENZODIAZ UR QL SCN>200 NG/ML: NEGATIVE
BILIRUB SERPL-MCNC: 0.4 MG/DL
BILIRUB UR QL STRIP: NEGATIVE
BUN SERPL-MCNC: 8 MG/DL
BZE UR QL SCN: NEGATIVE
CALCIUM SERPL-MCNC: 9 MG/DL
CANNABINOIDS UR QL SCN: NORMAL
CHLORIDE SERPL-SCNC: 104 MMOL/L
CK SERPL-CCNC: 1109 U/L
CLARITY UR REFRACT.AUTO: CLEAR
CO2 SERPL-SCNC: 26 MMOL/L
COLOR UR AUTO: YELLOW
CREAT SERPL-MCNC: 0.7 MG/DL
CREAT UR-MCNC: 65.7 MG/DL
DIFFERENTIAL METHOD: ABNORMAL
EOSINOPHIL # BLD AUTO: 0.1 K/UL
EOSINOPHIL NFR BLD: 0.8 %
ERYTHROCYTE [DISTWIDTH] IN BLOOD BY AUTOMATED COUNT: 14.9 %
EST. GFR  (AFRICAN AMERICAN): >60 ML/MIN/1.73 M^2
EST. GFR  (NON AFRICAN AMERICAN): >60 ML/MIN/1.73 M^2
FLUAV AG SPEC QL IA: NEGATIVE
FLUBV AG SPEC QL IA: NEGATIVE
GLUCOSE SERPL-MCNC: 96 MG/DL
GLUCOSE UR QL STRIP: NEGATIVE
HCT VFR BLD AUTO: 41.7 %
HGB BLD-MCNC: 14.4 G/DL
HGB UR QL STRIP: NEGATIVE
INR PPP: 1.2
KETONES UR QL STRIP: NEGATIVE
LACTATE SERPL-SCNC: 2.1 MMOL/L
LEUKOCYTE ESTERASE UR QL STRIP: NEGATIVE
LYMPHOCYTES # BLD AUTO: 1.4 K/UL
LYMPHOCYTES NFR BLD: 15.1 %
MCH RBC QN AUTO: 30.6 PG
MCHC RBC AUTO-ENTMCNC: 34.5 G/DL
MCV RBC AUTO: 89 FL
METHADONE UR QL SCN>300 NG/ML: NEGATIVE
MONOCYTES # BLD AUTO: 0.6 K/UL
MONOCYTES NFR BLD: 6.5 %
NEUTROPHILS # BLD AUTO: 7.1 K/UL
NEUTROPHILS NFR BLD: 77.4 %
NITRITE UR QL STRIP: NEGATIVE
OPIATES UR QL SCN: NEGATIVE
PCP UR QL SCN>25 NG/ML: NEGATIVE
PH UR STRIP: 8 [PH] (ref 5–8)
PLATELET # BLD AUTO: 202 K/UL
PMV BLD AUTO: 10.3 FL
POCT GLUCOSE: 92 MG/DL (ref 70–110)
POTASSIUM SERPL-SCNC: 3.7 MMOL/L
PROT SERPL-MCNC: 7.3 G/DL
PROT UR QL STRIP: NEGATIVE
PROTHROMBIN TIME: 11.9 SEC
RBC # BLD AUTO: 4.71 M/UL
SODIUM SERPL-SCNC: 142 MMOL/L
SP GR UR STRIP: 1.01 (ref 1–1.03)
SPECIMEN SOURCE: NORMAL
TOXICOLOGY INFORMATION: NORMAL
TROPONIN I SERPL DL<=0.01 NG/ML-MCNC: 0.02 NG/ML
TSH SERPL DL<=0.005 MIU/L-ACNC: 0.42 UIU/ML
URN SPEC COLLECT METH UR: NORMAL
UROBILINOGEN UR STRIP-ACNC: <2 EU/DL
VALPROATE SERPL-MCNC: 30.2 UG/ML
WBC # BLD AUTO: 9.22 K/UL

## 2018-02-09 PROCEDURE — 82550 ASSAY OF CK (CPK): CPT

## 2018-02-09 PROCEDURE — 93010 ELECTROCARDIOGRAM REPORT: CPT | Mod: ,,, | Performed by: INTERNAL MEDICINE

## 2018-02-09 PROCEDURE — 85025 COMPLETE CBC W/AUTO DIFF WBC: CPT

## 2018-02-09 PROCEDURE — 85610 PROTHROMBIN TIME: CPT

## 2018-02-09 PROCEDURE — 80307 DRUG TEST PRSMV CHEM ANLYZR: CPT

## 2018-02-09 PROCEDURE — 81003 URINALYSIS AUTO W/O SCOPE: CPT | Mod: 59

## 2018-02-09 PROCEDURE — 80299 QUANTITATIVE ASSAY DRUG: CPT

## 2018-02-09 PROCEDURE — P9612 CATHETERIZE FOR URINE SPEC: HCPCS

## 2018-02-09 PROCEDURE — 63600175 PHARM REV CODE 636 W HCPCS: Performed by: EMERGENCY MEDICINE

## 2018-02-09 PROCEDURE — 63600175 PHARM REV CODE 636 W HCPCS: Performed by: NURSE PRACTITIONER

## 2018-02-09 PROCEDURE — 87040 BLOOD CULTURE FOR BACTERIA: CPT

## 2018-02-09 PROCEDURE — 80164 ASSAY DIPROPYLACETIC ACD TOT: CPT

## 2018-02-09 PROCEDURE — 96375 TX/PRO/DX INJ NEW DRUG ADDON: CPT

## 2018-02-09 PROCEDURE — 93005 ELECTROCARDIOGRAM TRACING: CPT

## 2018-02-09 PROCEDURE — 96365 THER/PROPH/DIAG IV INF INIT: CPT

## 2018-02-09 PROCEDURE — 84484 ASSAY OF TROPONIN QUANT: CPT

## 2018-02-09 PROCEDURE — 25000003 PHARM REV CODE 250: Performed by: EMERGENCY MEDICINE

## 2018-02-09 PROCEDURE — 80053 COMPREHEN METABOLIC PANEL: CPT

## 2018-02-09 PROCEDURE — 80177 DRUG SCRN QUAN LEVETIRACETAM: CPT

## 2018-02-09 PROCEDURE — 87400 INFLUENZA A/B EACH AG IA: CPT | Mod: 59

## 2018-02-09 PROCEDURE — 27000221 HC OXYGEN, UP TO 24 HOURS

## 2018-02-09 PROCEDURE — 82962 GLUCOSE BLOOD TEST: CPT

## 2018-02-09 PROCEDURE — 96361 HYDRATE IV INFUSION ADD-ON: CPT

## 2018-02-09 PROCEDURE — 99900035 HC TECH TIME PER 15 MIN (STAT)

## 2018-02-09 PROCEDURE — 84443 ASSAY THYROID STIM HORMONE: CPT

## 2018-02-09 PROCEDURE — 83605 ASSAY OF LACTIC ACID: CPT

## 2018-02-09 PROCEDURE — 99285 EMERGENCY DEPT VISIT HI MDM: CPT | Mod: 25

## 2018-02-09 RX ORDER — KETOROLAC TROMETHAMINE 30 MG/ML
15 INJECTION, SOLUTION INTRAMUSCULAR; INTRAVENOUS
Status: COMPLETED | OUTPATIENT
Start: 2018-02-09 | End: 2018-02-09

## 2018-02-09 RX ORDER — LORAZEPAM 2 MG/ML
INJECTION INTRAMUSCULAR
Status: DISCONTINUED
Start: 2018-02-09 | End: 2018-02-09 | Stop reason: HOSPADM

## 2018-02-09 RX ORDER — LEVETIRACETAM 1000 MG/1
1500 TABLET ORAL 2 TIMES DAILY
Qty: 60 TABLET | Refills: 0 | Status: SHIPPED | OUTPATIENT
Start: 2018-02-09 | End: 2018-04-09 | Stop reason: SDUPTHER

## 2018-02-09 RX ORDER — DIVALPROEX SODIUM 250 MG/1
250 TABLET, DELAYED RELEASE ORAL
Status: COMPLETED | OUTPATIENT
Start: 2018-02-09 | End: 2018-02-09

## 2018-02-09 RX ORDER — LEVETIRACETAM 10 MG/ML
1000 INJECTION INTRAVASCULAR ONCE
Status: COMPLETED | OUTPATIENT
Start: 2018-02-09 | End: 2018-02-09

## 2018-02-09 RX ADMIN — SODIUM CHLORIDE 1000 ML: 0.9 INJECTION, SOLUTION INTRAVENOUS at 11:02

## 2018-02-09 RX ADMIN — KETOROLAC TROMETHAMINE 15 MG: 30 INJECTION, SOLUTION INTRAMUSCULAR at 11:02

## 2018-02-09 RX ADMIN — DIVALPROEX SODIUM 250 MG: 250 TABLET, DELAYED RELEASE ORAL at 02:02

## 2018-02-09 RX ADMIN — LEVETIRACETAM 1000 MG: 1000 INJECTION, SOLUTION INTRAVENOUS at 02:02

## 2018-02-09 RX ADMIN — LORAZEPAM 1 MG: 2 INJECTION, SOLUTION INTRAMUSCULAR; INTRAVENOUS at 01:02

## 2018-02-09 NOTE — ED NOTES
[place] noise from sliding door, went to look and pt was found between mother's wheelchair and door. Denies injury. Dr. Hussein aware. No injuries noted. Pt AAO x 3. Awaiting ride home for pt. Will monitor.

## 2018-02-09 NOTE — ED PROVIDER NOTES
Encounter Date: 2/9/2018       History     Chief Complaint   Patient presents with    Seizures     Family told EMS he has a seizure at 0400 this am. Pt was found on floor PTA but pt told EMS he was just hurting all over and could not get up     The history is provided by the patient and the EMS personnel.   Seizures    This is a recurrent problem. The current episode started today (about 5 hours ago). The problem has been resolved. There was 1 seizure. Duration: unknown duration or if he actually had one. Associated symptoms include sleepiness. Pertinent negatives include no confusion, no headaches, no speech difficulty, no visual disturbance, no neck stiffness, no sore throat, no chest pain, no cough, no nausea, no vomiting, no diarrhea and no muscle weakness. unwitnessed The episode was not witnessed. The seizures did not continue in the ED. Possible causes do not include medication or dosage change, sleep deprivation, missed seizure meds, recent illness or change in alcohol use. Associated symptoms comments: Body aches. There were no medications administered prior to arrival.       Home health was visiting the patient today and it was unclear if the patient possibly had a seizure early this morning around 4:30.  Patient complained of diffuse body aches and they called 911 due to patient to the emergency department for evaluation.  On arrival the patient states that he has no complaints and feels normal.  He doesn't feel sick in any way and is not complaining of the pain now.  He is a very poor historian however.      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 Negative for fever.   HENT: Negative for sore throat.    Eyes: Negative for visual disturbance.   Respiratory: Negative for cough and shortness of breath.    Cardiovascular: Negative for chest pain.   Gastrointestinal: Negative for diarrhea, nausea and vomiting.   Genitourinary: Negative for dysuria.   Musculoskeletal: Positive for myalgias. Negative for back pain.   Skin: Negative for rash.   Neurological: Positive for seizures. Negative for speech difficulty, weakness and headaches.   Hematological: Does not bruise/bleed easily.   Psychiatric/Behavioral: Negative for confusion.   All other systems reviewed and are negative.      Physical Exam     Initial Vitals [02/09/18 0946]   BP Pulse Resp Temp SpO2   (!) 141/71 85 20 98.2 °F (36.8 °C) 97 %      MAP       94.33         Vitals:    02/09/18 0946 02/09/18 1047 02/09/18 1112 02/09/18 1114   BP: (!) 141/71 (!) 141/65 130/87 (!) 157/69   Pulse: 85 65 78 83   Resp: 20 14     Temp: 98.2 °F (36.8 °C)      TempSrc: Oral      SpO2: 97% 100%     Weight: 68 kg (150 lb)       02/09/18 1147 02/09/18 1309 02/09/18 1310 02/09/18 1312   BP: 127/62      Pulse: 76 79 (!) 167 (!) 136   Resp: 13 (!) 23     Temp:       TempSrc:       SpO2: 96% 98% (!) 77% (!) 85%   Weight:        02/09/18 1313 02/09/18 1315 02/09/18 1322 02/09/18 1402   BP:  (!) 182/80  138/71   Pulse: (!) 143 (!) 115 (!) 113 84   Resp:  20 18 15   Temp:       TempSrc:       SpO2: 98% 100% (!) 93% 100%   Weight:        02/09/18 1502   BP: (!) 143/74   Pulse: 81   Resp: 18   Temp:    TempSrc:    SpO2: 98%   Weight:       Physical Exam    Nursing note and vitals reviewed.  Constitutional: He appears well-developed and well-nourished.   Baseline tremor   HENT:   Head: Normocephalic and atraumatic.   Mouth/Throat: Oropharynx is clear and moist.   Eyes: EOM are normal. Pupils are equal, round, and reactive to light.   Neck: Normal range of motion. Neck supple.   Cardiovascular: Normal rate, regular rhythm, normal heart sounds and intact distal pulses.   Pulmonary/Chest: Breath sounds normal. No respiratory distress. He has no wheezes. He has no rhonchi.   Abdominal: Soft. Bowel sounds are normal. There is no tenderness. There is no rebound.   Musculoskeletal: Normal range of motion. He exhibits no edema.   Neurological: He is alert and oriented to person, place, and time. He has normal strength. No cranial nerve deficit or sensory deficit.   Skin: Skin is warm and dry. Capillary refill takes less than 2 seconds. No rash noted.   Psychiatric: He has a normal mood and affect. His behavior is normal. Judgment and thought content normal.         ED Course   Procedures  Labs Reviewed   CBC W/ AUTO DIFFERENTIAL - Abnormal; Notable for the following:        Result Value    RDW 14.9 (*)     Gran% 77.4 (*)     Lymph% 15.1 (*)     All other components within normal limits   CK - Abnormal; Notable for the following:     CPK 1109 (*)     All other components within normal limits   CULTURE, BLOOD   CULTURE, BLOOD   URINALYSIS   INFLUENZA A AND B ANTIGEN   DRUG SCREEN PANEL, URINE EMERGENCY   TSH   TROPONIN I   PROTIME-INR   LACTIC ACID, PLASMA   COMPREHENSIVE METABOLIC PANEL   LACOSAMIDE (VIMPAT)   LEVETIRACETAM  (KEPPRA) LEVEL   VALPROIC ACID   POCT GLUCOSE   POCT GLUCOSE MONITORING CONTINUOUS     EKG Readings: (Independently Interpreted)   Initial Reading: No STEMI. Rhythm: Normal Sinus Rhythm. Heart Rate: 72. Conduction: 1st Degree AV Block. Other Impression: Normal sinus rhythm at 72 with first-degree AV block.  Nonspecific ST-T wave  abnormalities-no STEMI              Results for orders placed or performed during the hospital encounter of 02/09/18   CBC auto differential   Result Value Ref Range    WBC 9.22 3.90 - 12.70 K/uL    RBC 4.71 4.60 - 6.20 M/uL    Hemoglobin 14.4 14.0 - 18.0 g/dL    Hematocrit 41.7 40.0 - 54.0 %    MCV 89 82 - 98 fL    MCH 30.6 27.0 - 31.0 pg    MCHC 34.5 32.0 - 36.0 g/dL    RDW 14.9 (H) 11.5 - 14.5 %    Platelets 202 150 - 350 K/uL    MPV 10.3 9.2 - 12.9 fL    Gran # (ANC) 7.1 1.8 - 7.7 K/uL    Lymph # 1.4 1.0 - 4.8 K/uL    Mono # 0.6 0.3 - 1.0 K/uL    Eos # 0.1 0.0 - 0.5 K/uL    Baso # 0.01 0.00 - 0.20 K/uL    Gran% 77.4 (H) 38.0 - 73.0 %    Lymph% 15.1 (L) 18.0 - 48.0 %    Mono% 6.5 4.0 - 15.0 %    Eosinophil% 0.8 0.0 - 8.0 %    Basophil% 0.1 0.0 - 1.9 %    Differential Method Automated    Urinalysis - Clean Catch   Result Value Ref Range    Specimen UA Urine, Clean Catch     Color, UA Yellow Yellow, Straw, Leslee    Appearance, UA Clear Clear    pH, UA 8.0 5.0 - 8.0    Specific Gravity, UA 1.010 1.005 - 1.030    Protein, UA Negative Negative    Glucose, UA Negative Negative    Ketones, UA Negative Negative    Bilirubin (UA) Negative Negative    Occult Blood UA Negative Negative    Nitrite, UA Negative Negative    Urobilinogen, UA <2.0 <2.0 EU/dL    Leukocytes, UA Negative Negative   Influenza antigen Nasopharyngeal Swab   Result Value Ref Range    Influenza A Ag, EIA Negative Negative    Influenza B Ag, EIA Negative Negative    Flu A & B Source Nasopharyngeal Swab    Drug screen panel, emergency   Result Value Ref Range    Benzodiazepines Negative     Methadone metabolites Negative     Cocaine (Metab.) Negative     Opiate Scrn, Ur Negative     Barbiturate Screen, Ur Negative     Amphetamine Screen, Ur Negative     THC Presumptive Positive     Phencyclidine Negative     Creatinine, Random Ur 65.7 23.0 - 375.0 mg/dL    Toxicology Information SEE COMMENT    TSH   Result Value Ref Range    TSH 0.424 0.400 - 4.000 uIU/mL    Troponin I   Result Value Ref Range    Troponin I 0.018 0.000 - 0.026 ng/mL   Protime-INR   Result Value Ref Range    Prothrombin Time 11.9 9.0 - 12.5 sec    INR 1.2 0.8 - 1.2   Lactic acid, plasma   Result Value Ref Range    Lactate (Lactic Acid) 2.1 0.5 - 2.2 mmol/L   Comprehensive metabolic panel   Result Value Ref Range    Sodium 142 136 - 145 mmol/L    Potassium 3.7 3.5 - 5.1 mmol/L    Chloride 104 95 - 110 mmol/L    CO2 26 23 - 29 mmol/L    Glucose 96 70 - 110 mg/dL    BUN, Bld 8 6 - 20 mg/dL    Creatinine 0.7 0.5 - 1.4 mg/dL    Calcium 9.0 8.7 - 10.5 mg/dL    Total Protein 7.3 6.0 - 8.4 g/dL    Albumin 3.9 3.5 - 5.2 g/dL    Total Bilirubin 0.4 0.1 - 1.0 mg/dL    Alkaline Phosphatase 94 55 - 135 U/L    AST 25 10 - 40 U/L    ALT 14 10 - 44 U/L    Anion Gap 12 8 - 16 mmol/L    eGFR if African American >60.0 >60 mL/min/1.73 m^2    eGFR if non African American >60.0 >60 mL/min/1.73 m^2   CPK   Result Value Ref Range    CPK 1109 (H) 20 - 200 U/L   POCT glucose   Result Value Ref Range    POCT Glucose 92 70 - 110 mg/dL             Imaging Results          X-Ray Chest AP Portable (Final result)  Result time 02/09/18 10:49:07    Final result by YANCY Siddiqi Sr., MD (02/09/18 10:49:07)                 Impression:        1. The lungs are clear.   2. There is partial visualization of a K wire across a healed fracture in the proximal diaphyseal portion of the right humerus. The right humeral head is deformed.        Electronically signed by: YANCY SIDDIQI MD  Date:     02/09/18  Time:    10:49              Narrative:    One-view chest x-ray    Clinical History: Chest pain    Finding: Comparison was made to a prior examination performed on 6/12/2016. The size of the heart is normal. The lungs are clear. There is no pneumothorax.  The costophrenic angles are sharp. There is partial visualization of a K wire across a healed fracture in the proximal diaphyseal portion of the right humerus. The right humeral head is  "deformed.                                      ED Course    10:11 AM  it is noted the patient has had 9 CAT scans in the last 2-3 years.  I don't appreciate any focal neurologic deficit and the patient has minimal complaints at this time so I will not order a CT head for his "altered mental status" at this time.    12:23 PM patient resting comfortably and caretaker visited the emergency department to check on him and states he is at his baseline mental status right now and is not complaining like he was at home.  Some clarification to his drug list shows that he is on Depakote as opposed to Vimpat at this time.  I will add that level to his labs    1308 - patient had additional seizure.  To be tonic-clonic and lasted about 1 minute the patient is postictal afterwards - given Ativan 1 mg IV.    2:02 PM I discussed the case with Dr. Adam on call for neurology.  We discussed different treatment plans including transfer to [place] for admission versus adjusting medications at this time.  Dr. Adam recommends increasing his Keppra from thousand milligrams to 1500 mg twice a day.  He notes he is also on a low dose of Depakote but does not want to adjust both medicines at the same time.  We'll continue to observe the patient and discussed the treatment plan with the family.    4:09 PM patient remains resting comfortably baseline mental status per his mother.  She does admit that she thinks he might of been missing some of his medicines lately.  He is tolerating by mouth well now and she feels very comfortable with the treatment plan at this time.  They will return to emergency department for any worsening signs or symptoms and follow up with her neurologist as scheduled.    4:46 PM patient awaiting a ride home and is ambulating around the room tripped and fell over his mother's wheelchair.  He states he did not sustain any injury and seems to have a significantly unsteady gait with his tremor.  Patient and the " "mother state that is his baseline and he is "always like that"          Clinical Impression:   The primary encounter diagnosis was Seizure. A diagnosis of Seizures was also pertinent to this visit.    Disposition:   Disposition: Discharged  Condition: Stable                        Sebas Hussein MD  02/09/18 1611       Sebas Hussein MD  02/09/18 164    "

## 2018-02-09 NOTE — ED NOTES
Mother called me to patient's room stating patient is having a seizure. Upon entering room, patient's BUE are clenched, BLE are extended and very stiff, and body raised . Patient's HR increased to 170s and O2 saturation dropped to mid to low 80s. Dr. Hussein called to bedside, Ativan ordered verbally. Darlene RT at bedside - O2 via NC applied. Seizure-like activity lasted about 1.5 minutes. Ativan 1 mg IVP given per ALEX Cho. Pt's HR decreased to 120s O2 sat increased 100%. Bite block inserted for patient's safety. Primary nurse, ALEX Sen notified of patient's seizure and interventions performed. Will continue to monitor.

## 2018-02-10 DIAGNOSIS — R56.9 SEIZURES: Chronic | ICD-10-CM

## 2018-02-10 DIAGNOSIS — M19.011 ARTHROPATHY OF RIGHT SHOULDER: ICD-10-CM

## 2018-02-10 NOTE — ED NOTES
Report received from ALEX Sen. I have assumed care of the pt at this time. Pt is sitting up on ER stretcher, resp e/u, skin warm and dry, nad. Pt is waiting for a ride home, and he denies having any needs at this time. Bed locked in lowest position, side rails up X2, call bell in reach, curtain left open to room for close monitoring. Will continue to monitor.

## 2018-02-12 LAB — LEVETIRACETAM SERPL-MCNC: 12.5 UG/ML (ref 3–60)

## 2018-02-12 RX ORDER — MELOXICAM 15 MG/1
15 TABLET ORAL DAILY PRN
Qty: 60 TABLET | Refills: 0 | Status: SHIPPED | OUTPATIENT
Start: 2018-02-12 | End: 2018-04-09 | Stop reason: SDUPTHER

## 2018-02-12 RX ORDER — DIVALPROEX SODIUM 250 MG/1
250 TABLET, FILM COATED, EXTENDED RELEASE ORAL 2 TIMES DAILY
Qty: 60 TABLET | Refills: 0 | Status: SHIPPED | OUTPATIENT
Start: 2018-02-12 | End: 2018-03-16 | Stop reason: SDUPTHER

## 2018-02-13 LAB — LACOSAMIDE: <0.5 MCG/ML

## 2018-02-14 LAB
BACTERIA BLD CULT: NORMAL
BACTERIA BLD CULT: NORMAL

## 2018-03-16 DIAGNOSIS — R56.9 SEIZURES: Chronic | ICD-10-CM

## 2018-03-16 RX ORDER — DIVALPROEX SODIUM 250 MG/1
250 TABLET, FILM COATED, EXTENDED RELEASE ORAL 2 TIMES DAILY
Qty: 60 TABLET | Refills: 0 | Status: SHIPPED | OUTPATIENT
Start: 2018-03-16 | End: 2018-04-20 | Stop reason: SDUPTHER

## 2018-04-09 DIAGNOSIS — M19.011 ARTHROPATHY OF RIGHT SHOULDER: ICD-10-CM

## 2018-04-09 DIAGNOSIS — R56.9 SEIZURES: Chronic | ICD-10-CM

## 2018-04-09 RX ORDER — LEVETIRACETAM 1000 MG/1
1500 TABLET ORAL 2 TIMES DAILY
Qty: 60 TABLET | Refills: 0 | Status: SHIPPED | OUTPATIENT
Start: 2018-04-09 | End: 2018-05-02 | Stop reason: SDUPTHER

## 2018-04-09 RX ORDER — MELOXICAM 15 MG/1
15 TABLET ORAL DAILY PRN
Qty: 60 TABLET | Refills: 0 | Status: SHIPPED | OUTPATIENT
Start: 2018-04-09 | End: 2018-05-07 | Stop reason: SDUPTHER

## 2018-04-20 DIAGNOSIS — R56.9 SEIZURES: Chronic | ICD-10-CM

## 2018-04-20 RX ORDER — DIVALPROEX SODIUM 250 MG/1
250 TABLET, FILM COATED, EXTENDED RELEASE ORAL 2 TIMES DAILY
Qty: 60 TABLET | Refills: 1 | Status: SHIPPED | OUTPATIENT
Start: 2018-04-20 | End: 2018-05-03 | Stop reason: SDUPTHER

## 2018-04-25 ENCOUNTER — HOSPITAL ENCOUNTER (EMERGENCY)
Facility: HOSPITAL | Age: 58
Discharge: HOME | End: 2018-04-25
Attending: EMERGENCY MEDICINE
Payer: MEDICARE

## 2018-04-25 VITALS
HEART RATE: 88 BPM | SYSTOLIC BLOOD PRESSURE: 129 MMHG | BODY MASS INDEX: 23.65 KG/M2 | WEIGHT: 151 LBS | DIASTOLIC BLOOD PRESSURE: 88 MMHG | RESPIRATION RATE: 18 BRPM | OXYGEN SATURATION: 99 % | TEMPERATURE: 99 F

## 2018-04-25 DIAGNOSIS — R56.9 SEIZURE: ICD-10-CM

## 2018-04-25 DIAGNOSIS — I10 ESSENTIAL HYPERTENSION: Primary | ICD-10-CM

## 2018-04-25 LAB
ALBUMIN SERPL BCP-MCNC: 3.8 G/DL
ALP SERPL-CCNC: 91 U/L
ALT SERPL W/O P-5'-P-CCNC: 9 U/L
ANION GAP SERPL CALC-SCNC: 12 MMOL/L
AST SERPL-CCNC: 13 U/L
BASOPHILS # BLD AUTO: 0.03 K/UL
BASOPHILS NFR BLD: 0.6 %
BILIRUB SERPL-MCNC: 0.3 MG/DL
BILIRUB UR QL STRIP: NEGATIVE
BUN SERPL-MCNC: 4 MG/DL
CALCIUM SERPL-MCNC: 9.4 MG/DL
CHLORIDE SERPL-SCNC: 102 MMOL/L
CLARITY UR REFRACT.AUTO: CLEAR
CO2 SERPL-SCNC: 25 MMOL/L
COLOR UR AUTO: YELLOW
CREAT SERPL-MCNC: 1.1 MG/DL
DIFFERENTIAL METHOD: ABNORMAL
EOSINOPHIL # BLD AUTO: 0.4 K/UL
EOSINOPHIL NFR BLD: 8.2 %
ERYTHROCYTE [DISTWIDTH] IN BLOOD BY AUTOMATED COUNT: 13.6 %
EST. GFR  (AFRICAN AMERICAN): >60 ML/MIN/1.73 M^2
EST. GFR  (NON AFRICAN AMERICAN): >60 ML/MIN/1.73 M^2
GLUCOSE SERPL-MCNC: 78 MG/DL
GLUCOSE UR QL STRIP: NEGATIVE
HCT VFR BLD AUTO: 40.3 %
HGB BLD-MCNC: 13.9 G/DL
HGB UR QL STRIP: NEGATIVE
KETONES UR QL STRIP: NEGATIVE
LEUKOCYTE ESTERASE UR QL STRIP: NEGATIVE
LYMPHOCYTES # BLD AUTO: 2.4 K/UL
LYMPHOCYTES NFR BLD: 47.4 %
MCH RBC QN AUTO: 30.7 PG
MCHC RBC AUTO-ENTMCNC: 34.5 G/DL
MCV RBC AUTO: 89 FL
MONOCYTES # BLD AUTO: 0.5 K/UL
MONOCYTES NFR BLD: 9.5 %
NEUTROPHILS # BLD AUTO: 1.8 K/UL
NEUTROPHILS NFR BLD: 34.3 %
NITRITE UR QL STRIP: NEGATIVE
PH UR STRIP: 7 [PH] (ref 5–8)
PLATELET # BLD AUTO: 263 K/UL
PMV BLD AUTO: 10.1 FL
POTASSIUM SERPL-SCNC: 3.9 MMOL/L
PROT SERPL-MCNC: 7.4 G/DL
PROT UR QL STRIP: NEGATIVE
RBC # BLD AUTO: 4.53 M/UL
SODIUM SERPL-SCNC: 139 MMOL/L
SP GR UR STRIP: 1.01 (ref 1–1.03)
URN SPEC COLLECT METH UR: NORMAL
UROBILINOGEN UR STRIP-ACNC: <2 EU/DL
VALPROATE SERPL-MCNC: 32.3 UG/ML
WBC # BLD AUTO: 5.15 K/UL

## 2018-04-25 PROCEDURE — 25000003 PHARM REV CODE 250: Performed by: EMERGENCY MEDICINE

## 2018-04-25 PROCEDURE — 81003 URINALYSIS AUTO W/O SCOPE: CPT

## 2018-04-25 PROCEDURE — 80164 ASSAY DIPROPYLACETIC ACD TOT: CPT

## 2018-04-25 PROCEDURE — 93005 ELECTROCARDIOGRAM TRACING: CPT

## 2018-04-25 PROCEDURE — 96361 HYDRATE IV INFUSION ADD-ON: CPT

## 2018-04-25 PROCEDURE — 85025 COMPLETE CBC W/AUTO DIFF WBC: CPT

## 2018-04-25 PROCEDURE — 99900035 HC TECH TIME PER 15 MIN (STAT)

## 2018-04-25 PROCEDURE — 96360 HYDRATION IV INFUSION INIT: CPT

## 2018-04-25 PROCEDURE — 80177 DRUG SCRN QUAN LEVETIRACETAM: CPT

## 2018-04-25 PROCEDURE — 99285 EMERGENCY DEPT VISIT HI MDM: CPT | Mod: 25

## 2018-04-25 PROCEDURE — 93010 ELECTROCARDIOGRAM REPORT: CPT | Mod: ,,, | Performed by: INTERNAL MEDICINE

## 2018-04-25 PROCEDURE — 80053 COMPREHEN METABOLIC PANEL: CPT

## 2018-04-25 RX ADMIN — SODIUM CHLORIDE 1000 ML: 0.9 INJECTION, SOLUTION INTRAVENOUS at 03:04

## 2018-04-25 NOTE — ED NOTES
Patient BIBA with the report of a seizure. Per [facility] the first medic on scene reports arriving 3 mins after call from patients mother and patient awake, alert and at baseline. Patient cognitively delayed. Patient lives with elderly frail mother and is reported to have a home health nurse that comes and gives him his medication daily. Per patient he does not recall seeing anyone today. BBSCTA, SR on CM, skin warm and dry resp even and unlabored. Patient has a tremor noted to LUE intermittently. Patient answers yes and no questions and ask questions. Speech low, slow and clear. [facility] reports mother wants to be called at time of discharge. Will continue to monitor.

## 2018-04-25 NOTE — ED PROVIDER NOTES
Encounter Date: 4/25/2018       History     Chief Complaint   Patient presents with    Seizures     The patient is a 58-year-old male with history of seizure disorder.  History is primarily from [facility] ambulance service.  Reportedly family member witnessed a 10-15 minutes seizure.  Unknown if patient was post ictal.  No known loss of bowel or bladder.  Patient is a difficult historian.  He does have a history of schizophrenia, Parkinson's disease, and seizure disorder.  Patient denies any headache, chest pain, chest pressure, shortness of breath, abdominal pain.  Unknown if patient is compliant with his medication.  We believe that home health does stop by the house to gave him medications.  Patient as a historian is somewhat poor.  Patient is known to the emergency department.  According to nursing staff, patient appears to be at baseline.            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   HENT: Negative for sore throat.    Respiratory: Negative for shortness of breath.    Cardiovascular: Negative for chest pain.   Gastrointestinal: Negative for nausea.   Genitourinary: Negative for dysuria.   Musculoskeletal: Negative for back pain.   Skin: Negative for rash.  "  Neurological: Negative for weakness.   Hematological: Does not bruise/bleed easily.       Physical Exam     Initial Vitals [04/25/18 1442]   BP Pulse Resp Temp SpO2   (!) 90/46 74 18 98 °F (36.7 °C) 95 %      MAP       60.67         Physical Exam    Nursing note and vitals reviewed.  Constitutional: He appears well-developed and well-nourished.   HENT:   Head: Atraumatic. Head is without raccoon's eyes, without Murray's sign, without abrasion and without contusion.   Right Ear: External ear normal. No hemotympanum.   Left Ear: External ear normal. No hemotympanum.   Nose: No mucosal edema, rhinorrhea or sinus tenderness.   Mouth/Throat: Uvula is midline and oropharynx is clear and moist. No oropharyngeal exudate, posterior oropharyngeal edema, posterior oropharyngeal erythema or tonsillar abscesses.   No abrasions or contusions noted to the tongue.   Eyes: Conjunctivae and EOM are normal. Pupils are equal, round, and reactive to light.   Neck: Trachea normal. Neck supple. No thyroid mass present. No stridor present. No tracheal tenderness present. No tracheal deviation and normal range of motion present.   Cardiovascular: Normal rate and regular rhythm.   Pulmonary/Chest: Breath sounds normal. No respiratory distress. He has no wheezes. He has no rhonchi. He has no rales. He exhibits no tenderness.   Abdominal: Soft. Bowel sounds are normal. He exhibits no distension and no mass. There is no tenderness. There is no rebound and no guarding.   Musculoskeletal: Normal range of motion.   Neurological: He is alert. He has normal strength and normal reflexes. No cranial nerve deficit or sensory deficit.   Patient states that he's in  "[place]"  when asked where he was at.  When asked to open his mouth, and stick out his tongue, he opened his mouth and started barking at me.  Subsequently he stated stated "I got her good".  Patient is pleasant.  Per nursing staff who is familiar with him, patient is at baseline.   Skin: " Skin is warm. Capillary refill takes less than 2 seconds.   Psychiatric:   Behavior slightly bizarre, he is cooperative         ED Course   Procedures  Labs Reviewed   CBC W/ AUTO DIFFERENTIAL - Abnormal; Notable for the following:        Result Value    RBC 4.53 (*)     Hemoglobin 13.9 (*)     Gran% 34.3 (*)     Eosinophil% 8.2 (*)     All other components within normal limits   COMPREHENSIVE METABOLIC PANEL - Abnormal; Notable for the following:     BUN, Bld 4 (*)     ALT 9 (*)     All other components within normal limits   URINALYSIS   VALPROIC ACID   LEVETIRACETAM  (KEPPRA) LEVEL         Results for orders placed or performed during the hospital encounter of 04/25/18   CBC auto differential   Result Value Ref Range    WBC 5.15 3.90 - 12.70 K/uL    RBC 4.53 (L) 4.60 - 6.20 M/uL    Hemoglobin 13.9 (L) 14.0 - 18.0 g/dL    Hematocrit 40.3 40.0 - 54.0 %    MCV 89 82 - 98 fL    MCH 30.7 27.0 - 31.0 pg    MCHC 34.5 32.0 - 36.0 g/dL    RDW 13.6 11.5 - 14.5 %    Platelets 263 150 - 350 K/uL    MPV 10.1 9.2 - 12.9 fL    Gran # (ANC) 1.8 1.8 - 7.7 K/uL    Lymph # 2.4 1.0 - 4.8 K/uL    Mono # 0.5 0.3 - 1.0 K/uL    Eos # 0.4 0.0 - 0.5 K/uL    Baso # 0.03 0.00 - 0.20 K/uL    Gran% 34.3 (L) 38.0 - 73.0 %    Lymph% 47.4 18.0 - 48.0 %    Mono% 9.5 4.0 - 15.0 %    Eosinophil% 8.2 (H) 0.0 - 8.0 %    Basophil% 0.6 0.0 - 1.9 %    Differential Method Automated    Comprehensive metabolic panel   Result Value Ref Range    Sodium 139 136 - 145 mmol/L    Potassium 3.9 3.5 - 5.1 mmol/L    Chloride 102 95 - 110 mmol/L    CO2 25 23 - 29 mmol/L    Glucose 78 70 - 110 mg/dL    BUN, Bld 4 (L) 6 - 20 mg/dL    Creatinine 1.1 0.5 - 1.4 mg/dL    Calcium 9.4 8.7 - 10.5 mg/dL    Total Protein 7.4 6.0 - 8.4 g/dL    Albumin 3.8 3.5 - 5.2 g/dL    Total Bilirubin 0.3 0.1 - 1.0 mg/dL    Alkaline Phosphatase 91 55 - 135 U/L    AST 13 10 - 40 U/L    ALT 9 (L) 10 - 44 U/L    Anion Gap 12 8 - 16 mmol/L    eGFR if African American >60.0 >60 mL/min/1.73 m^2     eGFR if non African American >60.0 >60 mL/min/1.73 m^2   Urinalysis   Result Value Ref Range    Specimen UA Urine, Clean Catch     Color, UA Yellow Yellow, Straw, Leslee    Appearance, UA Clear Clear    pH, UA 7.0 5.0 - 8.0    Specific Gravity, UA 1.010 1.005 - 1.030    Protein, UA Negative Negative    Glucose, UA Negative Negative    Ketones, UA Negative Negative    Bilirubin (UA) Negative Negative    Occult Blood UA Negative Negative    Nitrite, UA Negative Negative    Urobilinogen, UA <2.0 <2.0 EU/dL    Leukocytes, UA Negative Negative       Imaging Results          CT Head Without Contrast (Final result)  Result time 04/25/18 15:43:25    Final result by Cecelia Schofield MD (04/25/18 15:43:25)                 Impression:      No acute findings.  Stable CT of the brain.  Advanced volume loss with several foci of encephalomalacia.  Postsurgical changes.        All CT scans at this facility use dose modulation, iterative reconstruction and/or weight based dosing when appropriate to reduce radiation dose to as low as reasonably achievable.       Electronically signed by: CECELIA SCHOFIELD MD  Date:     04/25/18  Time:    15:43              Narrative:    CT HEAD WITHOUT CONTRAST     History:  seizure     Technique:  Noncontrast CT of the brain.     Comparison: 02/01/2017.    Findings:  Generalized volume loss is present consistent with advanced cerebral and cerebellar atrophy.  There are postoperative changes with evidence of a right frontal vipul hole and a left temporal craniotomy site.    Small zone of right frontal encephalomalacia is present.  Moderate size left temporal and adjacent parietal lobe encephalomalacia.    The findings are similar to the previous study.    No additional findings.                              EKG Readings: (Independently Interpreted)   EKG: Rate 69 bpm.  Normal axis.  No acute ST or T-wave changes.                                Medications   sodium chloride 0.9% bolus 1,000 mL (0  mLs Intravenous Stopped 4/25/18 1733)       6:00PM Reassessment: Dr. White reassessed the pt.  The pt is resting comfortably and is NAD.  Pt states their sx have improved.  Patient's been awake alert and oriented to his baseline.  Patient able to ambulate without difficulty.  Discussed test results, shared treatment plan, specific conditions for return, and the need for f/u.  Answered their questions at this time.  Pt understands and agrees to the plan.  The pt has remained hemodynamically stable through ED course and is stable for discharge.    Note: CT scan was performed on this patient as he had altered mental status and seizure.    Follow-up Information     Sumeet Koo MD In 2 days.    Specialty:  Family Medicine  Why:  Return to the ED for:   confusion, fever, headache, weakness, recurrent seizure or other concerns.  Contact information:  [address] [address] [address]  [phone]                   New Prescriptions    No medications on file        Discontinued Medications    No medications on file       I discussed with patient and/or family/caretaker that evaluation in the ED does not suggest any emergent or life threatening medical conditions requiring immediate intervention beyond what was provided in the ED, and I believe patient is safe for discharge.  Regardless, an unremarkable evaluation in the ED does not preclude the development or presence of a serious of life threatening condition. As such, patient was instructed to return immediately for any worsening or change in current symptoms.    Patient presents with seizure or seizure-like symptoms. I have no suspicion of an intracranial, traumatic, infectious, metabolic, toxic, cardiovascular (specifically arrhythmia), or other emergent medical condition requiring further intervention. Seizure precautions were discussed with the patient and/or caretaker, specifically not to swim unattended, not to operate motor vehicles or other machinery,  and to avoid heights or other areas where falls may occur until cleared by primary care physician. Patient is safe for discharge.       Clinical Impression:       ICD-10-CM ICD-9-CM   1. Essential hypertension I10 401.9   2. Seizure R56.9 780.39         Disposition:   Disposition: Discharged  Condition: Stable                        Mireya White,   04/25/18 1820       Mireya White,   04/25/18 1826

## 2018-04-25 NOTE — ED NOTES
Patient reports right shoulder pain. +ROM and Sensation noted. Unknown if fell during seizure. Will continue to monitor.

## 2018-04-27 LAB — LEVETIRACETAM SERPL-MCNC: 50.9 UG/ML (ref 3–60)

## 2018-05-02 DIAGNOSIS — R56.9 SEIZURES: Chronic | ICD-10-CM

## 2018-05-02 RX ORDER — LEVETIRACETAM 1000 MG/1
1500 TABLET ORAL 2 TIMES DAILY
Qty: 60 TABLET | Refills: 1 | Status: SHIPPED | OUTPATIENT
Start: 2018-05-02 | End: 2018-07-04 | Stop reason: SDUPTHER

## 2018-05-03 ENCOUNTER — OFFICE VISIT (OUTPATIENT)
Dept: INTERNAL MEDICINE | Facility: CLINIC | Age: 58
End: 2018-05-03
Payer: MEDICARE

## 2018-05-03 VITALS
HEART RATE: 76 BPM | OXYGEN SATURATION: 96 % | RESPIRATION RATE: 16 BRPM | HEIGHT: 67 IN | TEMPERATURE: 97 F | BODY MASS INDEX: 22.29 KG/M2 | WEIGHT: 142 LBS | DIASTOLIC BLOOD PRESSURE: 60 MMHG | SYSTOLIC BLOOD PRESSURE: 116 MMHG

## 2018-05-03 DIAGNOSIS — F20.3 UNDIFFERENTIATED SCHIZOPHRENIA: Chronic | ICD-10-CM

## 2018-05-03 DIAGNOSIS — I10 ESSENTIAL HYPERTENSION: ICD-10-CM

## 2018-05-03 DIAGNOSIS — T42.6X5A VALPROIC ACID-INDUCED TREMOR: Chronic | ICD-10-CM

## 2018-05-03 DIAGNOSIS — R56.9 SEIZURES: Chronic | ICD-10-CM

## 2018-05-03 DIAGNOSIS — F12.90 MARIJUANA USE, CONTINUOUS: ICD-10-CM

## 2018-05-03 DIAGNOSIS — R63.4 WEIGHT LOSS, UNINTENTIONAL: Primary | ICD-10-CM

## 2018-05-03 DIAGNOSIS — G25.1 VALPROIC ACID-INDUCED TREMOR: Chronic | ICD-10-CM

## 2018-05-03 PROCEDURE — 99214 OFFICE O/P EST MOD 30 MIN: CPT | Mod: S$GLB,,, | Performed by: FAMILY MEDICINE

## 2018-05-03 PROCEDURE — 3008F BODY MASS INDEX DOCD: CPT | Mod: CPTII,S$GLB,, | Performed by: FAMILY MEDICINE

## 2018-05-03 PROCEDURE — 99999 PR PBB SHADOW E&M-EST. PATIENT-LVL III: CPT | Mod: PBBFAC,,, | Performed by: FAMILY MEDICINE

## 2018-05-03 PROCEDURE — 3074F SYST BP LT 130 MM HG: CPT | Mod: CPTII,S$GLB,, | Performed by: FAMILY MEDICINE

## 2018-05-03 PROCEDURE — 3078F DIAST BP <80 MM HG: CPT | Mod: CPTII,S$GLB,, | Performed by: FAMILY MEDICINE

## 2018-05-03 RX ORDER — DIVALPROEX SODIUM 250 MG/1
250 TABLET, FILM COATED, EXTENDED RELEASE ORAL 2 TIMES DAILY
Qty: 60 TABLET | Refills: 1 | Status: SHIPPED | OUTPATIENT
Start: 2018-05-03 | End: 2018-08-03 | Stop reason: SDUPTHER

## 2018-05-03 RX ORDER — AMLODIPINE BESYLATE 10 MG/1
10 TABLET ORAL DAILY
Qty: 90 TABLET | Refills: 0 | Status: SHIPPED | OUTPATIENT
Start: 2018-05-03 | End: 2018-08-03 | Stop reason: SDUPTHER

## 2018-05-03 RX ORDER — LACOSAMIDE 100 MG/1
1 TABLET ORAL 2 TIMES DAILY
Qty: 60 TABLET | Refills: 0 | Status: SHIPPED | OUTPATIENT
Start: 2018-05-03 | End: 2018-05-28 | Stop reason: SDUPTHER

## 2018-05-03 RX ORDER — OLANZAPINE 10 MG/1
5 TABLET ORAL 2 TIMES DAILY
Qty: 90 TABLET | Refills: 0 | Status: SHIPPED | OUTPATIENT
Start: 2018-05-03 | End: 2018-06-25 | Stop reason: SDUPTHER

## 2018-05-03 RX ORDER — HALOPERIDOL 1 MG/1
5 TABLET ORAL 2 TIMES DAILY
Qty: 90 TABLET | Refills: 0 | Status: SHIPPED | OUTPATIENT
Start: 2018-05-03 | End: 2018-05-28 | Stop reason: SDUPTHER

## 2018-05-03 RX ORDER — OLANZAPINE 5 MG/1
5 TABLET ORAL 2 TIMES DAILY
Qty: 60 TABLET | Refills: 1 | Status: SHIPPED | OUTPATIENT
Start: 2018-05-03 | End: 2018-05-03 | Stop reason: CLARIF

## 2018-05-03 NOTE — PROGRESS NOTES
Subjective:       Patient ID: Smooth Spring is a 58 y.o. male.    Chief Complaint: Follow-up (seizures, htn)    Weight loss:  Noticed by sitter. States that mother has lost 15 lbs and Smooth has lost 10 lbs.  She has also noticed marijuana use in the pt hosue.  D:  Unknown at present.  M:  No medicating or exacerbating factors known      Review of Systems   Respiratory: Negative for shortness of breath.    Cardiovascular: Negative for chest pain.   Gastrointestinal: Negative for abdominal pain.       Objective:      Physical Exam   Constitutional: He appears well-developed and well-nourished. No distress.   HENT:   Head: Normocephalic and atraumatic.   Pulmonary/Chest: Effort normal and breath sounds normal. No respiratory distress. He has no wheezes.   Musculoskeletal: Normal range of motion. He exhibits no edema, tenderness or deformity.   Neurological:   Julienne repetitive hand movements noted.-chronic   Skin: Skin is warm and dry. No rash noted. He is not diaphoretic. No erythema.   Nursing note and vitals reviewed.      Assessment:       1. Weight loss, unintentional    2. Marijuana use, continuous    3. Valproic acid-induced tremor    4. Seizures    5. Essential hypertension    6. Undifferentiated schizophrenia        Plan:     Problem List Items Addressed This Visit        Neuro    Seizures (Chronic)    Relevant Medications    divalproex ER (DEPAKOTE ER) 250 MG 24 hr tablet    lacosamide (VIMPAT) 100 mg Tab    Valproic acid-induced tremor (Chronic)    Overview     Chronic, stable.  Seeing Neurology in June 2018              Psychiatric    Schizophrenia (Chronic)    Relevant Medications    haloperidol (HALDOL) 1 MG tablet    OLANZapine (ZYPREXA) 10 MG tablet    Marijuana use, continuous    Relevant Orders    Toxicology screen, urine       Cardiac/Vascular    Essential hypertension    Relevant Medications    amLODIPine (NORVASC) 10 MG tablet       Endocrine    Weight loss, unintentional - Primary    Current Assessment  & Plan     Patient's weight has declined by approximately 9 lb from my most recent visit.  Will re-evaluate patient at next appointment and see if he has lost weight or if he is stable at the moment.  I have ordered a urine toxicology screen to validate the concerns of the sitter as well as to make sure the patient is using other illicit substances at this time.

## 2018-05-03 NOTE — TELEPHONE ENCOUNTER
Spoke to pts care giver chi and asked her if the pt did his urine and she said no that she tried to get him to drink some water but he never could go so she still has the order and the cup and if he goes in the am she will bring the urine then.

## 2018-05-07 DIAGNOSIS — M19.011 ARTHROPATHY OF RIGHT SHOULDER: ICD-10-CM

## 2018-05-07 RX ORDER — MELOXICAM 15 MG/1
15 TABLET ORAL DAILY PRN
Qty: 60 TABLET | Refills: 0 | Status: SHIPPED | OUTPATIENT
Start: 2018-05-07 | End: 2018-06-25 | Stop reason: SDUPTHER

## 2018-05-10 NOTE — ASSESSMENT & PLAN NOTE
Patient's weight has declined by approximately 9 lb from my most recent visit.  Will re-evaluate patient at next appointment and see if he has lost weight or if he is stable at the moment.  I have ordered a urine toxicology screen to validate the concerns of the sitter as well as to make sure the patient is using other illicit substances at this time.

## 2018-05-14 DIAGNOSIS — R56.9 SEIZURES: Chronic | ICD-10-CM

## 2018-05-14 RX ORDER — LACOSAMIDE 100 MG/1
TABLET, FILM COATED ORAL
Qty: 60 TABLET | Refills: 0 | OUTPATIENT
Start: 2018-05-14

## 2018-05-26 ENCOUNTER — HOSPITAL ENCOUNTER (EMERGENCY)
Facility: HOSPITAL | Age: 58
Discharge: HOME | End: 2018-05-26
Attending: EMERGENCY MEDICINE
Payer: MEDICARE

## 2018-05-26 VITALS
BODY MASS INDEX: 22.44 KG/M2 | HEART RATE: 93 BPM | TEMPERATURE: 98 F | RESPIRATION RATE: 16 BRPM | SYSTOLIC BLOOD PRESSURE: 125 MMHG | OXYGEN SATURATION: 97 % | DIASTOLIC BLOOD PRESSURE: 60 MMHG | WEIGHT: 143.31 LBS

## 2018-05-26 DIAGNOSIS — D17.1 LIPOMA OF BACK: Primary | ICD-10-CM

## 2018-05-26 PROCEDURE — 99283 EMERGENCY DEPT VISIT LOW MDM: CPT

## 2018-05-26 NOTE — ED PROVIDER NOTES
"Encounter Date: 5/26/2018       History     Chief Complaint   Patient presents with    Skin Ulcer     left flank "fluid pocket" per caregiver, caregiver hasn't seen x3 days and was not there previously     The history is provided by the patient.   Abscess    This is a new problem. The current episode started several days ago. The problem has been unchanged. The abscess is present on the back ("a pocket of something" to his back just below left scapula). The pain is at a severity of 0/10. Pertinent negatives include no anorexia, no decrease in physical activity, not sleeping less, not drinking less, no fever, no diarrhea, no vomiting, no congestion, no rhinorrhea, no sore throat, no decreased responsiveness and no cough. His past medical history does not include skin abscesses in family. There were no sick contacts.   Caregiver reports that the patient developed "this pocket of something" to his back "sometime in the past three days".  She states that the "pocket" was not there three days ago when she last saw him.  No further complaints or concerns noted.       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 " "  HENT: Negative for congestion, rhinorrhea and sore throat.    Respiratory: Negative for cough and shortness of breath.    Cardiovascular: Negative for chest pain.   Gastrointestinal: Negative for abdominal pain, anorexia, diarrhea, nausea and vomiting.   Genitourinary: Negative for dysuria.   Musculoskeletal: Negative for back pain.   Skin: Negative for rash.        Positive for "fluid pocket" of the left upper back.    Neurological: Positive for tremors (chronic). Negative for weakness and headaches.   Hematological: Does not bruise/bleed easily.       Physical Exam     Initial Vitals [05/26/18 1335]   BP Pulse Resp Temp SpO2   125/60 93 16 98.4 °F (36.9 °C) 97 %      MAP       81.67         Physical Exam    Nursing note and vitals reviewed.  Constitutional: Vital signs are normal. He appears well-developed and well-nourished. He is cooperative. He does not appear ill. No distress.   HENT:   Head: Normocephalic and atraumatic.   Right Ear: Hearing and external ear normal.   Left Ear: Hearing and external ear normal.   Nose: Nose normal.   Mouth/Throat: Uvula is midline, oropharynx is clear and moist and mucous membranes are normal.   Eyes: Conjunctivae, EOM and lids are normal. Pupils are equal, round, and reactive to light.   Neck: Trachea normal and normal range of motion. Neck supple.   Cardiovascular: Normal rate, regular rhythm, intact distal pulses and normal pulses.   Pulmonary/Chest: Effort normal and breath sounds normal. No accessory muscle usage. No respiratory distress. He has no decreased breath sounds. He has no wheezes. He has no rhonchi. He has no rales.           Tenderness and subcutaneous pocket noted to left subscapular area.  The area is fluctuant.  Exam is consistent with a lipoma.   Abdominal: Soft. He exhibits no distension and no mass. There is no tenderness. There is no rebound and no guarding.   Musculoskeletal: Normal range of motion. He exhibits no edema.   Neurological: He is alert " and oriented to person, place, and time. He has normal strength. GCS eye subscore is 4. GCS verbal subscore is 5. GCS motor subscore is 6.   Patient is at his baseline. Hand tremor noted which is a chronic condition.    Skin: Skin is warm, dry and intact. Capillary refill takes less than 2 seconds. No rash noted.   Psychiatric: He has a normal mood and affect. His speech is normal and behavior is normal.   Patient is at his baseline.              ED Course   Procedures    ED Imaging Results:   Imaging Results          CT Chest Without Contrast (Edited Result - FINAL)  Result time 05/26/18 15:17:53    Addendum 1 of 1 by Ishan Giron III, MD (05/26/18 15:17:53)      The initial CT images with a small field-of-view partially excluded a benign lipoma inferior lateral to the left scapula.  Subsequent CT images with a larger field of view demonstrate a benign fat density ovoid lipoma posterior lateral to the distal scapula superficial to the adjacent musculature measuring approximately 7 x 5 cm.  This mass demonstrates no suspicious features.  Findings were discussed with Dr. White.      Electronically signed by: Ishan Giron MD  Date:    05/26/2018  Time:    15:17               Final result by Ishan Giron III, MD (05/26/18 14:51:06)                 Impression:      No abnormality is seen adjacent to the left scapula at the site of palpable concern.    Ground-glass infiltrates versus prominent dependent atelectasis and gravitational change in the posterior lower lungs.  No other acute pulmonary disease identified.  Chronic bilateral upper rib cage fractures.  Chronic internally fixated fracture deformity of the proximal right humerus.      Electronically signed by: Ishan Giron MD  Date:    05/26/2018  Time:    14:51             Narrative:    EXAMINATION:  CT CHEST WITHOUT CONTRAST    CLINICAL HISTORY:  Localized swelling under the left scapula.Seroma vs. Fibroma dorsi; shortness of  breath.    TECHNIQUE:  Axial images through the chest were obtained without the use of IV contrast. All CT scans at this facility use dose modulation, iterative reconstruction, and/or weight based dosing when appropriate to reduce radiation dose to as low as reasonably achievable.    COMPARISON:  No prior chest CT available    FINDINGS:  No mass fluid collection or other significant abnormality seen adjacent to the left scapula at the site of palpable concern.  There are ground-glass infiltrates versus prominent dependent atelectasis and gravitational change in the posterior lower lobes.  No other acute appearing infiltrates identified.  There is mild chronic appearing interstitial thickening and fibrotic changes of the right middle lobe and lingula.  No pleural effusion, pneumothorax, or suspicious lung nodules or mass identified.  The airways are patent.  No pathologic lymphadenopathy is seen in the chest. There is no significant cardiomegaly.  Coronary artery and aortic atherosclerotic calcifications are noted.  Trace pericardial fluid is incidentally noted.  No aortic aneurysm identified.  Chronic internally fixated proximal right humerus fracture deformity is again noted.  There is a chronic anterior compression fracture of the T8 vertebral body.  There are chronic fractures of the bilateral upper ribcage with adjacent heterotopic ossification.  Negative for acute fracture or suspicious osseous lesions. No acute disease or suspicious mass seen in the upper abdomen.                                1515 HOURS RE-EVALUATION & DISPOSITION:   Reassessment at the time of disposition demonstrates that the patient is resting comfortably in no acute distress.  He has remained hemodynamically stable throughout the entire ED visit and is without objective evidence for acute process requiring urgent intervention or hospitalization. I discussed test results and provided counseling to patient with regard to condition, the  treatment plan, specific conditions for return, and the importance of follow up.  Answered questions at this time. The patient is stable for discharge.               Medical Decision Making:   History:   Old Records Summarized: records from clinic visits.  Clinical Tests:   Radiological Study: Ordered and Reviewed                      Clinical Impression:       ICD-10-CM ICD-9-CM   1. Lipoma of back D17.1 214.8         Disposition:   Disposition: Discharged  Condition: Stable  I discussed with patient that the evaluation in the emergency department does not suggest any emergent or life threatening medical condition requiring immediate intervention beyond what was provided in the ED, and I believe patient is safe for discharge.  Regardless, an unremarkable evaluation in the ED does not preclude the development or presence of a serious of life threatening condition. As such, patient was instructed to return immediately for any worsening or change in current symptoms. I also discussed the results of my evaluation and diagnostics with patient and he concurs with the evaluation and management plan.  Detailed written and verbal instructions provided to patient and he expressed a verbal understanding of the discharge instructions and overall management plan. Reiterated the importance of medication administration and safety and advised patient to follow up with primary care provider in 3-5 days or sooner if needed.  Also advised patient to return to the ER for any complications.              Follow-up Information     Call  Sumeet Koo MD.    Specialty:  Family Medicine  Why:  If symptoms worsen  Contact information:  [address] [address] [address] [phone]             Call  General Surgeon.    Why:  If symptoms worsen                            Jasson Howard NP  05/26/18 9269

## 2018-05-28 DIAGNOSIS — R56.9 SEIZURES: Chronic | ICD-10-CM

## 2018-05-28 DIAGNOSIS — F20.3 UNDIFFERENTIATED SCHIZOPHRENIA: Chronic | ICD-10-CM

## 2018-05-28 RX ORDER — HALOPERIDOL 1 MG/1
5 TABLET ORAL 2 TIMES DAILY
Qty: 90 TABLET | Refills: 0 | Status: SHIPPED | OUTPATIENT
Start: 2018-05-28 | End: 2018-08-03

## 2018-05-28 RX ORDER — LACOSAMIDE 100 MG/1
TABLET, FILM COATED ORAL
Qty: 60 TABLET | Refills: 0 | Status: SHIPPED | OUTPATIENT
Start: 2018-05-28 | End: 2018-06-25 | Stop reason: SDUPTHER

## 2018-06-06 ENCOUNTER — HOSPITAL ENCOUNTER (EMERGENCY)
Facility: HOSPITAL | Age: 58
Discharge: HOME | End: 2018-06-06
Attending: EMERGENCY MEDICINE
Payer: MEDICARE

## 2018-06-06 VITALS
HEART RATE: 63 BPM | BODY MASS INDEX: 22.44 KG/M2 | WEIGHT: 143 LBS | SYSTOLIC BLOOD PRESSURE: 120 MMHG | DIASTOLIC BLOOD PRESSURE: 58 MMHG | TEMPERATURE: 99 F | HEIGHT: 67 IN | OXYGEN SATURATION: 97 % | RESPIRATION RATE: 19 BRPM

## 2018-06-06 DIAGNOSIS — G40.909 RECURRENT SEIZURES: Primary | ICD-10-CM

## 2018-06-06 LAB
ALBUMIN SERPL BCP-MCNC: 3.8 G/DL
ALP SERPL-CCNC: 83 U/L
ALT SERPL W/O P-5'-P-CCNC: 6 U/L
ANION GAP SERPL CALC-SCNC: 10 MMOL/L
AST SERPL-CCNC: 12 U/L
BASOPHILS # BLD AUTO: 0.03 K/UL
BASOPHILS NFR BLD: 0.6 %
BILIRUB SERPL-MCNC: 0.4 MG/DL
BUN SERPL-MCNC: 11 MG/DL
CALCIUM SERPL-MCNC: 8.8 MG/DL
CHLORIDE SERPL-SCNC: 102 MMOL/L
CK SERPL-CCNC: 109 U/L
CO2 SERPL-SCNC: 28 MMOL/L
CREAT SERPL-MCNC: 1 MG/DL
DIFFERENTIAL METHOD: ABNORMAL
EOSINOPHIL # BLD AUTO: 0.4 K/UL
EOSINOPHIL NFR BLD: 7.3 %
ERYTHROCYTE [DISTWIDTH] IN BLOOD BY AUTOMATED COUNT: 13.5 %
EST. GFR  (AFRICAN AMERICAN): >60 ML/MIN/1.73 M^2
EST. GFR  (NON AFRICAN AMERICAN): >60 ML/MIN/1.73 M^2
ETHANOL SERPL-MCNC: <10 MG/DL
GLUCOSE SERPL-MCNC: 81 MG/DL
HCT VFR BLD AUTO: 36.1 %
HGB BLD-MCNC: 12.3 G/DL
LYMPHOCYTES # BLD AUTO: 2.7 K/UL
LYMPHOCYTES NFR BLD: 50.1 %
MCH RBC QN AUTO: 30.3 PG
MCHC RBC AUTO-ENTMCNC: 34.1 G/DL
MCV RBC AUTO: 89 FL
MONOCYTES # BLD AUTO: 0.4 K/UL
MONOCYTES NFR BLD: 8 %
NEUTROPHILS # BLD AUTO: 1.8 K/UL
NEUTROPHILS NFR BLD: 33.8 %
PLATELET # BLD AUTO: 216 K/UL
PMV BLD AUTO: 10 FL
POCT GLUCOSE: 70 MG/DL (ref 70–110)
POTASSIUM SERPL-SCNC: 3.5 MMOL/L
PROT SERPL-MCNC: 7.1 G/DL
RBC # BLD AUTO: 4.06 M/UL
SODIUM SERPL-SCNC: 140 MMOL/L
WBC # BLD AUTO: 5.37 K/UL

## 2018-06-06 PROCEDURE — 82962 GLUCOSE BLOOD TEST: CPT

## 2018-06-06 PROCEDURE — 93005 ELECTROCARDIOGRAM TRACING: CPT

## 2018-06-06 PROCEDURE — 80320 DRUG SCREEN QUANTALCOHOLS: CPT

## 2018-06-06 PROCEDURE — 85025 COMPLETE CBC W/AUTO DIFF WBC: CPT

## 2018-06-06 PROCEDURE — 80053 COMPREHEN METABOLIC PANEL: CPT

## 2018-06-06 PROCEDURE — 99900035 HC TECH TIME PER 15 MIN (STAT)

## 2018-06-06 PROCEDURE — 93010 ELECTROCARDIOGRAM REPORT: CPT | Mod: ,,, | Performed by: INTERNAL MEDICINE

## 2018-06-06 PROCEDURE — 99284 EMERGENCY DEPT VISIT MOD MDM: CPT | Mod: 25

## 2018-06-06 PROCEDURE — 82550 ASSAY OF CK (CPK): CPT

## 2018-06-06 PROCEDURE — 80164 ASSAY DIPROPYLACETIC ACD TOT: CPT

## 2018-06-07 LAB — VALPROATE SERPL-MCNC: 35.2 UG/ML

## 2018-06-07 NOTE — ED NOTES
Pt is back to his baseline neuro status. Pt continues to try to get out of bed. Fili, ERT is at the bedside. Will continue to monitor.

## 2018-06-07 NOTE — ED PROVIDER NOTES
Encounter Date: 6/6/2018       History     Chief Complaint   Patient presents with    Seizures     local seizure to E X 20min. pta     Patient currently presents with complaint of seizure.  This occurred just prior to arrival.  Duration of seizure was noted to be 10-15 min.  At this point the patient has not fully recovered from the postictal state but his MS is quickly improving.  Patient does have a history of prior seizures for which Valproate and Keppra are taken at home.  This SZ was notable for LUE tonic clonic activity which is his typical presentation. There have not been recent changes to the medication regimen.  Medication noncompliance is questionable.  Patient denies recent sleep deprivation or overexertion.            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   HENT: Negative for congestion.    Respiratory: Negative for chest tightness and shortness of breath.    Cardiovascular: Negative for chest pain and leg swelling.   Gastrointestinal: Negative for abdominal pain, constipation, diarrhea, nausea and vomiting.   Genitourinary: Negative for dysuria, frequency and urgency.   Skin: Negative for color change and rash.   Allergic/Immunologic:  Negative for immunocompromised state.   Neurological: Positive for seizures. Negative for facial asymmetry, weakness and numbness.   Hematological: Negative for adenopathy. Does not bruise/bleed easily.   All other systems reviewed and are negative.      Physical Exam     Initial Vitals [06/06/18 1945]   BP Pulse Resp Temp SpO2   (!) 155/87 80 19 98.9 °F (37.2 °C) 99 %      MAP       109.67         Physical Exam    Nursing note and vitals reviewed.  Constitutional: He appears well-developed and well-nourished. He is not diaphoretic. No distress.   HENT:   Head: Normocephalic and atraumatic.   Right Ear: External ear normal.   Left Ear: External ear normal.   Nose: Nose normal.   Mouth/Throat: Oropharynx is clear and moist.   Eyes: Conjunctivae and EOM are normal. Pupils are equal, round, and reactive to light. No scleral icterus.   Neck: Neck supple. No JVD present.   Cardiovascular: Normal rate, regular rhythm, normal heart sounds and intact distal pulses. Exam reveals no gallop and no friction rub.    No murmur heard.  Pulmonary/Chest: Breath sounds normal. No respiratory distress. He has no wheezes. He has no rhonchi. He has no rales.   Abdominal: Soft. Bowel sounds are normal. He exhibits no distension. There is no tenderness.   Musculoskeletal: Normal range of motion. He exhibits no edema.   Neurological: He is alert and oriented to person, place, and time. He has normal strength. No cranial nerve deficit or sensory deficit. He exhibits normal muscle tone. Coordination and gait normal. GCS eye subscore is 4. GCS verbal subscore is 5. GCS motor subscore is 6.   Skin: Skin is warm and dry. No rash noted.   Psychiatric: He has a normal mood and affect. His behavior is normal.         ED Course   Procedures  Labs Reviewed   CBC W/ AUTO DIFFERENTIAL - Abnormal; Notable for the following:        Result Value    RBC 4.06 (*)     Hemoglobin 12.3 (*)     Hematocrit 36.1 (*)     Gran% 33.8 (*)     Lymph% 50.1 (*)     All  other components within normal limits   COMPREHENSIVE METABOLIC PANEL - Abnormal; Notable for the following:     ALT 6 (*)     All other components within normal limits   CK   ALCOHOL,MEDICAL (ETHANOL)   VALPROIC ACID   POCT GLUCOSE     EKG Readings: (Independently Interpreted)   Initial Reading: No STEMI. Rhythm: Normal Sinus Rhythm. Heart Rate: 72. Ectopy: No Ectopy. Conduction: Normal. Axis: Normal. Other Impression: LVH       No orders to display        Medical Decision Making:   ED Management:  All findings were reviewed with the patient/family in detail along with the diagnosis of recurrent seizure acitivity.  This single seizure appeared to be consistent with prior activity and there appears to be no evidence of trauma.  He is currently ambulating and mentation is at his baseline.  His Valproic Acid level will return from the lab tomorrow.  I see no indication of an emergent process beyond that addressed during our encounter but have duly counseled the patient/family regarding the need for prompt follow-up as well as the indications that should prompt immediate return to the emergency room should new or worrisome developments occur.  The patient/family communicates understanding of all this information and all remaining questions and concerns were addressed at this time.                          Clinical Impression:   The encounter diagnosis was Recurrent seizures.                             Mitch Nation MD  06/06/18 3282

## 2018-06-25 DIAGNOSIS — M19.011 ARTHROPATHY OF RIGHT SHOULDER: ICD-10-CM

## 2018-06-25 DIAGNOSIS — R56.9 SEIZURES: Chronic | ICD-10-CM

## 2018-06-25 DIAGNOSIS — F20.3 UNDIFFERENTIATED SCHIZOPHRENIA: Chronic | ICD-10-CM

## 2018-06-25 RX ORDER — LACOSAMIDE 100 MG/1
1 TABLET ORAL 2 TIMES DAILY
Qty: 60 TABLET | Refills: 0 | Status: SHIPPED | OUTPATIENT
Start: 2018-06-25 | End: 2018-07-05 | Stop reason: SDUPTHER

## 2018-06-25 RX ORDER — MELOXICAM 15 MG/1
15 TABLET ORAL DAILY PRN
Qty: 60 TABLET | Refills: 0 | Status: SHIPPED | OUTPATIENT
Start: 2018-06-25 | End: 2018-08-30 | Stop reason: SDUPTHER

## 2018-06-25 RX ORDER — OLANZAPINE 10 MG/1
5 TABLET ORAL 2 TIMES DAILY
Qty: 90 TABLET | Refills: 0 | Status: SHIPPED | OUTPATIENT
Start: 2018-06-25 | End: 2018-08-03 | Stop reason: SDUPTHER

## 2018-07-04 DIAGNOSIS — R56.9 SEIZURES: Chronic | ICD-10-CM

## 2018-07-05 RX ORDER — LACOSAMIDE 100 MG/1
TABLET, FILM COATED ORAL
Qty: 60 TABLET | Refills: 0 | Status: SHIPPED | OUTPATIENT
Start: 2018-07-05 | End: 2018-07-13 | Stop reason: ALTCHOICE

## 2018-07-05 RX ORDER — LEVETIRACETAM 1000 MG/1
1500 TABLET ORAL 2 TIMES DAILY
Qty: 60 TABLET | Refills: 1 | Status: SHIPPED | OUTPATIENT
Start: 2018-07-05 | End: 2018-08-03 | Stop reason: SDUPTHER

## 2018-07-13 DIAGNOSIS — R56.9 SEIZURES: Chronic | ICD-10-CM

## 2018-07-13 RX ORDER — LACOSAMIDE 100 MG/1
TABLET, FILM COATED ORAL
Qty: 180 TABLET | Refills: 0 | Status: SHIPPED | OUTPATIENT
Start: 2018-07-13 | End: 2018-11-17 | Stop reason: SDUPTHER

## 2018-08-01 DIAGNOSIS — I10 ESSENTIAL HYPERTENSION: ICD-10-CM

## 2018-08-01 RX ORDER — AMLODIPINE BESYLATE 10 MG/1
TABLET ORAL
Qty: 90 TABLET | Refills: 0 | Status: CANCELLED | OUTPATIENT
Start: 2018-08-01

## 2018-08-03 ENCOUNTER — TELEPHONE (OUTPATIENT)
Dept: INTERNAL MEDICINE | Facility: CLINIC | Age: 58
End: 2018-08-03

## 2018-08-03 ENCOUNTER — OFFICE VISIT (OUTPATIENT)
Dept: INTERNAL MEDICINE | Facility: CLINIC | Age: 58
End: 2018-08-03
Payer: MEDICARE

## 2018-08-03 VITALS
WEIGHT: 141.75 LBS | BODY MASS INDEX: 22.25 KG/M2 | SYSTOLIC BLOOD PRESSURE: 118 MMHG | HEART RATE: 79 BPM | HEIGHT: 67 IN | TEMPERATURE: 98 F | DIASTOLIC BLOOD PRESSURE: 60 MMHG | OXYGEN SATURATION: 97 % | RESPIRATION RATE: 16 BRPM

## 2018-08-03 DIAGNOSIS — G20.A1 PARKINSONS DISEASE: ICD-10-CM

## 2018-08-03 DIAGNOSIS — Z13.220 ENCOUNTER FOR LIPID SCREENING FOR CARDIOVASCULAR DISEASE: ICD-10-CM

## 2018-08-03 DIAGNOSIS — I10 ESSENTIAL HYPERTENSION: Primary | ICD-10-CM

## 2018-08-03 DIAGNOSIS — Z13.6 ENCOUNTER FOR LIPID SCREENING FOR CARDIOVASCULAR DISEASE: ICD-10-CM

## 2018-08-03 DIAGNOSIS — F20.3 UNDIFFERENTIATED SCHIZOPHRENIA: Chronic | ICD-10-CM

## 2018-08-03 DIAGNOSIS — R56.9 SEIZURES: Chronic | ICD-10-CM

## 2018-08-03 PROCEDURE — 99999 PR PBB SHADOW E&M-EST. PATIENT-LVL III: CPT | Mod: PBBFAC,,, | Performed by: FAMILY MEDICINE

## 2018-08-03 PROCEDURE — 99214 OFFICE O/P EST MOD 30 MIN: CPT | Mod: S$GLB,,, | Performed by: FAMILY MEDICINE

## 2018-08-03 PROCEDURE — 3074F SYST BP LT 130 MM HG: CPT | Mod: CPTII,S$GLB,, | Performed by: FAMILY MEDICINE

## 2018-08-03 PROCEDURE — 3078F DIAST BP <80 MM HG: CPT | Mod: CPTII,S$GLB,, | Performed by: FAMILY MEDICINE

## 2018-08-03 PROCEDURE — 3008F BODY MASS INDEX DOCD: CPT | Mod: CPTII,S$GLB,, | Performed by: FAMILY MEDICINE

## 2018-08-03 RX ORDER — HALOPERIDOL 5 MG/1
5 TABLET ORAL 2 TIMES DAILY
Qty: 180 TABLET | Refills: 1 | Status: SHIPPED | OUTPATIENT
Start: 2018-08-03 | End: 2019-01-15 | Stop reason: SDUPTHER

## 2018-08-03 RX ORDER — AMLODIPINE BESYLATE 10 MG/1
10 TABLET ORAL DAILY
Qty: 90 TABLET | Refills: 1 | Status: SHIPPED | OUTPATIENT
Start: 2018-08-03 | End: 2019-02-16 | Stop reason: SDUPTHER

## 2018-08-03 RX ORDER — LEVETIRACETAM 1000 MG/1
1500 TABLET ORAL 2 TIMES DAILY
Qty: 60 TABLET | Refills: 1 | Status: SHIPPED | OUTPATIENT
Start: 2018-08-03 | End: 2018-10-10 | Stop reason: SDUPTHER

## 2018-08-03 RX ORDER — OMEPRAZOLE 20 MG/1
20 CAPSULE, DELAYED RELEASE ORAL DAILY
Refills: 3 | COMMUNITY
Start: 2018-05-27 | End: 2019-03-12

## 2018-08-03 RX ORDER — OLANZAPINE 10 MG/1
5 TABLET ORAL 2 TIMES DAILY
Qty: 90 TABLET | Refills: 1 | Status: SHIPPED | OUTPATIENT
Start: 2018-08-03 | End: 2019-01-15 | Stop reason: SDUPTHER

## 2018-08-03 RX ORDER — DIVALPROEX SODIUM 250 MG/1
250 TABLET, FILM COATED, EXTENDED RELEASE ORAL 2 TIMES DAILY
Qty: 180 TABLET | Refills: 0 | Status: SHIPPED | OUTPATIENT
Start: 2018-08-03 | End: 2018-09-05 | Stop reason: SDUPTHER

## 2018-08-03 NOTE — PROGRESS NOTES
Subjective:       Patient ID: Smooth Spring is a 58 y.o. male.    Chief Complaint: Follow-up (3 month)    Hypertension   This is a chronic problem. The current episode started more than 1 year ago. The problem has been resolved since onset. The problem is controlled. Pertinent negatives include no anxiety, blurred vision, chest pain, headaches, peripheral edema or shortness of breath. There are no associated agents to hypertension. Risk factors for coronary artery disease include male gender.     Review of Systems   Eyes: Negative for blurred vision.   Respiratory: Negative for shortness of breath.    Cardiovascular: Negative for chest pain.   Neurological: Negative for headaches.       Objective:      Physical Exam   Constitutional: He appears well-developed and well-nourished. No distress.   HENT:   Head: Normocephalic and atraumatic.   Pulmonary/Chest: Effort normal and breath sounds normal. No respiratory distress. He has no wheezes.   Neurological:   Constant tremor to left arm   Skin: Skin is warm and dry. No rash noted. He is not diaphoretic. No erythema.   Nursing note and vitals reviewed.      Assessment:       1. Essential hypertension    2. Seizures    3. Undifferentiated schizophrenia    4. Parkinsons disease    5. Encounter for lipid screening for cardiovascular disease        Plan:     Problem List Items Addressed This Visit        Neuro    Seizures (Chronic)    Relevant Medications    divalproex ER (DEPAKOTE ER) 250 MG 24 hr tablet    levETIRAcetam (KEPPRA) 1000 MG tablet    Parkinsons disease    Relevant Orders    Ambulatory Referral to Neurology       Psychiatric    Schizophrenia (Chronic)    Relevant Medications    OLANZapine (ZYPREXA) 10 MG tablet       Cardiac/Vascular    Essential hypertension - Primary    Relevant Medications    amLODIPine (NORVASC) 10 MG tablet    Encounter for lipid screening for cardiovascular disease    Relevant Orders    Lipid panel

## 2018-08-03 NOTE — TELEPHONE ENCOUNTER
Can someone in neurology please call this and schedule him an appt at the [place] location. We are having trouble scheduling him. Please let us know once yall talk to and get the pt scheduled. Thanks in advance!

## 2018-08-10 DIAGNOSIS — R56.9 SEIZURES: Chronic | ICD-10-CM

## 2018-08-10 RX ORDER — LACOSAMIDE 100 MG/1
TABLET, FILM COATED ORAL
Qty: 60 TABLET | Refills: 0 | OUTPATIENT
Start: 2018-08-10

## 2018-08-10 NOTE — TELEPHONE ENCOUNTER
Spoke with caregiver. She requested refill, but the family had already gotten it filled at [facility] and she did not know.

## 2018-08-30 DIAGNOSIS — M19.011 ARTHROPATHY OF RIGHT SHOULDER: ICD-10-CM

## 2018-08-30 RX ORDER — MELOXICAM 15 MG/1
15 TABLET ORAL DAILY PRN
Qty: 60 TABLET | Refills: 0 | Status: SHIPPED | OUTPATIENT
Start: 2018-08-30 | End: 2019-03-12

## 2018-09-05 DIAGNOSIS — R56.9 SEIZURES: Chronic | ICD-10-CM

## 2018-09-05 RX ORDER — DIVALPROEX SODIUM 250 MG/1
TABLET, FILM COATED, EXTENDED RELEASE ORAL
Qty: 180 TABLET | Refills: 0 | Status: SHIPPED | OUTPATIENT
Start: 2018-09-05 | End: 2018-09-07 | Stop reason: SDUPTHER

## 2018-09-07 DIAGNOSIS — R56.9 SEIZURES: Chronic | ICD-10-CM

## 2018-09-07 RX ORDER — DIVALPROEX SODIUM 250 MG/1
TABLET, FILM COATED, EXTENDED RELEASE ORAL
Qty: 180 TABLET | Refills: 0 | Status: SHIPPED | OUTPATIENT
Start: 2018-09-07 | End: 2019-03-12 | Stop reason: SDUPTHER

## 2018-09-26 ENCOUNTER — HOSPITAL ENCOUNTER (EMERGENCY)
Facility: HOSPITAL | Age: 58
Discharge: HOME | End: 2018-09-26
Attending: EMERGENCY MEDICINE
Payer: MEDICARE

## 2018-09-26 VITALS
WEIGHT: 141 LBS | DIASTOLIC BLOOD PRESSURE: 65 MMHG | HEIGHT: 70 IN | OXYGEN SATURATION: 98 % | RESPIRATION RATE: 20 BRPM | SYSTOLIC BLOOD PRESSURE: 118 MMHG | TEMPERATURE: 98 F | HEART RATE: 54 BPM | BODY MASS INDEX: 20.19 KG/M2

## 2018-09-26 DIAGNOSIS — R56.9 SEIZURE: Chronic | ICD-10-CM

## 2018-09-26 DIAGNOSIS — Z91.148 NONCOMPLIANCE WITH MEDICATIONS: Primary | ICD-10-CM

## 2018-09-26 DIAGNOSIS — R56.9 SEIZURE: ICD-10-CM

## 2018-09-26 DIAGNOSIS — G20.A1 PARKINSONS DISEASE: ICD-10-CM

## 2018-09-26 LAB
ALBUMIN SERPL BCP-MCNC: 4.1 G/DL
ALP SERPL-CCNC: 88 U/L
ALT SERPL W/O P-5'-P-CCNC: 10 U/L
ANION GAP SERPL CALC-SCNC: 11 MMOL/L
AST SERPL-CCNC: 15 U/L
BASOPHILS # BLD AUTO: 0.04 K/UL
BASOPHILS NFR BLD: 0.6 %
BILIRUB SERPL-MCNC: 0.5 MG/DL
BNP SERPL-MCNC: 35 PG/ML
BUN SERPL-MCNC: 7 MG/DL
CALCIUM SERPL-MCNC: 9.8 MG/DL
CHLORIDE SERPL-SCNC: 106 MMOL/L
CO2 SERPL-SCNC: 27 MMOL/L
CREAT SERPL-MCNC: 0.9 MG/DL
DIFFERENTIAL METHOD: ABNORMAL
EOSINOPHIL # BLD AUTO: 0.4 K/UL
EOSINOPHIL NFR BLD: 6.3 %
ERYTHROCYTE [DISTWIDTH] IN BLOOD BY AUTOMATED COUNT: 14.6 %
EST. GFR  (AFRICAN AMERICAN): >60 ML/MIN/1.73 M^2
EST. GFR  (NON AFRICAN AMERICAN): >60 ML/MIN/1.73 M^2
ETHANOL SERPL-MCNC: <10 MG/DL
GLUCOSE SERPL-MCNC: 72 MG/DL
HCT VFR BLD AUTO: 43.8 %
HGB BLD-MCNC: 14.8 G/DL
LYMPHOCYTES # BLD AUTO: 2.7 K/UL
LYMPHOCYTES NFR BLD: 43.9 %
MCH RBC QN AUTO: 29.7 PG
MCHC RBC AUTO-ENTMCNC: 33.8 G/DL
MCV RBC AUTO: 88 FL
MONOCYTES # BLD AUTO: 0.5 K/UL
MONOCYTES NFR BLD: 8.1 %
NEUTROPHILS # BLD AUTO: 2.5 K/UL
NEUTROPHILS NFR BLD: 40.9 %
PLATELET # BLD AUTO: 220 K/UL
PMV BLD AUTO: 9.7 FL
POTASSIUM SERPL-SCNC: 3.9 MMOL/L
PROT SERPL-MCNC: 8.6 G/DL
RBC # BLD AUTO: 4.98 M/UL
SODIUM SERPL-SCNC: 144 MMOL/L
TROPONIN I SERPL DL<=0.01 NG/ML-MCNC: <0.006 NG/ML
VALPROATE SERPL-MCNC: 41.6 UG/ML
WBC # BLD AUTO: 6.18 K/UL

## 2018-09-26 PROCEDURE — 83880 ASSAY OF NATRIURETIC PEPTIDE: CPT

## 2018-09-26 PROCEDURE — 85025 COMPLETE CBC W/AUTO DIFF WBC: CPT

## 2018-09-26 PROCEDURE — 63600175 PHARM REV CODE 636 W HCPCS: Performed by: EMERGENCY MEDICINE

## 2018-09-26 PROCEDURE — 80320 DRUG SCREEN QUANTALCOHOLS: CPT

## 2018-09-26 PROCEDURE — 93005 ELECTROCARDIOGRAM TRACING: CPT

## 2018-09-26 PROCEDURE — 99285 EMERGENCY DEPT VISIT HI MDM: CPT | Mod: 25

## 2018-09-26 PROCEDURE — 80164 ASSAY DIPROPYLACETIC ACD TOT: CPT

## 2018-09-26 PROCEDURE — 93010 ELECTROCARDIOGRAM REPORT: CPT | Mod: ,,, | Performed by: INTERNAL MEDICINE

## 2018-09-26 PROCEDURE — 80053 COMPREHEN METABOLIC PANEL: CPT

## 2018-09-26 PROCEDURE — 84484 ASSAY OF TROPONIN QUANT: CPT

## 2018-09-26 PROCEDURE — 25000003 PHARM REV CODE 250: Performed by: EMERGENCY MEDICINE

## 2018-09-26 PROCEDURE — 99900035 HC TECH TIME PER 15 MIN (STAT)

## 2018-09-26 PROCEDURE — 96365 THER/PROPH/DIAG IV INF INIT: CPT

## 2018-09-26 RX ORDER — LEVETIRACETAM 10 MG/ML
1000 INJECTION INTRAVASCULAR
Status: COMPLETED | OUTPATIENT
Start: 2018-09-26 | End: 2018-09-26

## 2018-09-26 RX ORDER — ASPIRIN 325 MG
325 TABLET ORAL
Status: COMPLETED | OUTPATIENT
Start: 2018-09-26 | End: 2018-09-26

## 2018-09-26 RX ADMIN — LEVETIRACETAM 1000 MG: 10 INJECTION INTRAVENOUS at 02:09

## 2018-09-26 RX ADMIN — SODIUM CHLORIDE 500 ML: 0.9 INJECTION, SOLUTION INTRAVENOUS at 02:09

## 2018-09-26 RX ADMIN — ASPIRIN 325 MG ORAL TABLET 325 MG: 325 PILL ORAL at 02:09

## 2018-09-26 NOTE — ED PROVIDER NOTES
Encounter Date: 9/26/2018       History     Chief Complaint   Patient presents with    Seizures     non complaint      The history is provided by the patient.   Seizures    This is a recurrent problem. The current episode started just prior to arrival. The problem has been rapidly improving. There was 1 seizure. The most recent episode lasted less than 30 seconds. Associated symptoms include sleepiness and confusion. Pertinent negatives include no headaches, no speech difficulty, no visual disturbance, no sore throat, no chest pain, no cough, no nausea, no vomiting, no diarrhea and no muscle weakness. Characteristics include eye blinking, rhythmic jerking and loss of consciousness. The episode was witnessed. There was no sensation of an aura present. The seizures did not continue in the ED. The seizure(s) had no focality. Possible causes include sleep deprivation and missed seizure meds. There were no medications administered prior to arrival.     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   HENT: Negative for sore throat.    Eyes: Negative for visual disturbance.   Respiratory: Negative for cough and shortness of  breath.    Cardiovascular: Negative for chest pain.   Gastrointestinal: Negative for diarrhea, nausea and vomiting.   Genitourinary: Negative for dysuria.   Musculoskeletal: Negative for back pain.   Skin: Negative for rash.   Neurological: Positive for seizures and loss of consciousness. Negative for speech difficulty, weakness and headaches.   Hematological: Does not bruise/bleed easily.   Psychiatric/Behavioral: Positive for confusion.   All other systems reviewed and are negative.      Physical Exam     Initial Vitals [09/26/18 1305]   BP Pulse Resp Temp SpO2   104/69 83 20 97.8 °F (36.6 °C) 98 %      MAP       --         Physical Exam    Nursing note and vitals reviewed.  Constitutional: He appears well-developed and well-nourished. He is not diaphoretic. No distress.   HENT:   Head: Normocephalic and atraumatic.   Eyes: EOM are normal. Pupils are equal, round, and reactive to light. No scleral icterus.   Neck: Normal range of motion. Neck supple. No thyromegaly present.   Cardiovascular: Normal rate, regular rhythm, normal heart sounds and intact distal pulses. Exam reveals no gallop and no friction rub.    No murmur heard.  Pulmonary/Chest: Breath sounds normal. No respiratory distress. He has no wheezes. He has no rhonchi. He exhibits no tenderness.   Abdominal: Soft. Bowel sounds are normal. He exhibits no distension. There is no tenderness. There is no rebound and no guarding.   Musculoskeletal: Normal range of motion. He exhibits no edema or tenderness.   Lymphadenopathy:     He has no cervical adenopathy.   Neurological: He is alert and oriented to person, place, and time. He has normal strength. No cranial nerve deficit or sensory deficit.   Skin: Skin is warm and dry.   Psychiatric: He has a normal mood and affect. His behavior is normal. Judgment and thought content normal.         ED Course   Procedures  Labs Reviewed   CBC W/ AUTO DIFFERENTIAL - Abnormal; Notable for the following components:        Result Value    RDW 14.6 (*)     All other components within normal limits   COMPREHENSIVE METABOLIC PANEL - Abnormal; Notable for the following components:    Total Protein 8.6 (*)     All other components within normal limits   TROPONIN I   B-TYPE NATRIURETIC PEPTIDE   ALCOHOL,MEDICAL (ETHANOL)   VALPROIC ACID   DRUG SCREEN PANEL, URINE EMERGENCY          Imaging Results          CT Head Without Contrast (Final result)  Result time 09/26/18 13:59:49    Final result by Adi Florentino III, MD (09/26/18 13:59:49)                 Impression:      Stable CT brain compared to April.  Atrophy with postop changes and encephalomalacia changes again noted bilaterally.  No bleed or other acute abnormality suggested.    All CT scans at this facility are performed  using dose modulation techniques as appropriate to performed exam including the following:  automated exposure control; adjustment of mA and/or kV according to the patients size (this includes techniques or standardized protocols for targeted exams where dose is matched to indication/reason for exam: i.e. extremities or head);  iterative reconstruction technique.      Electronically signed by: Adi Florentino MD  Date:    09/26/2018  Time:    13:59             Narrative:    EXAMINATION:  CT HEAD WITHOUT CONTRAST    CLINICAL HISTORY:  Unspecified convulsionsseizure;    TECHNIQUE:  Standard non contrast CT scan of the brain.    COMPARISON:  April    FINDINGS:  There are again noted generalized changes of atrophy both supra and infratentorial.  Patchy changes of low attenuation again noted in the periventricular and subcortical white matter.  Multiple zones of encephalomalacia again noted in the occluding the left temporal and parietal regions as well as the right frontal region.  Ventricular prominence felt to be upper limits of normal for this degree of atrophy.  There is no hemorrhage, mass lesion, or definite hydrocephalus.  No midline shift.  No  "acute/depressed skull fracture.  Postop changes along the skull again noted.                               X-Ray Chest AP Portable (Final result)  Result time 09/26/18 13:47:50    Final result by YANCY Siddiqi Sr., MD (09/26/18 13:47:50)                 Impression:      1. There is no focal pulmonary infiltrate visualized.  2. There is persistent deformity of the visualized portion of the right humerus. There is partial visualization of an intramedullary nail in the right humerus.  .      Electronically signed by: Ciaran Siddiqi MD  Date:    09/26/2018  Time:    13:47             Narrative:    EXAMINATION:  XR CHEST AP PORTABLE    CLINICAL HISTORY:  seizure;    COMPARISON:  08/03/2018    FINDINGS:  The size of the heart is normal.  There is no focal pulmonary infiltrate visualized.  There is no pneumothorax.  The costophrenic angles are sharp.  There is persistent deformity of the visualized portion of the right humerus.  There is partial visualization of an intramedullary nail in the right humerus.                                    Vitals:    09/26/18 1305 09/26/18 1332 09/26/18 1333 09/26/18 1432   BP: 104/69  124/60 132/75   Pulse: 83 62 (!) 58 (!) 52   Resp: 20  11 15   Temp: 97.8 °F (36.6 °C)      TempSrc: Oral      SpO2: 98%  97% 97%   Weight: 64 kg (141 lb)      Height: 5' 10" (1.778 m)       09/26/18 1447 09/26/18 1502   BP: 123/69 118/65   Pulse: (!) 51 (!) 54   Resp: 16 20   Temp:     TempSrc:     SpO2: 99% 98%   Weight:     Height:         Results for orders placed or performed during the hospital encounter of 09/26/18   CBC auto differential   Result Value Ref Range    WBC 6.18 3.90 - 12.70 K/uL    RBC 4.98 4.60 - 6.20 M/uL    Hemoglobin 14.8 14.0 - 18.0 g/dL    Hematocrit 43.8 40.0 - 54.0 %    MCV 88 82 - 98 fL    MCH 29.7 27.0 - 31.0 pg    MCHC 33.8 32.0 - 36.0 g/dL    RDW 14.6 (H) 11.5 - 14.5 %    Platelets 220 150 - 350 K/uL    MPV 9.7 9.2 - 12.9 fL    Gran # (ANC) 2.5 1.8 - 7.7 K/uL    Lymph # " 2.7 1.0 - 4.8 K/uL    Mono # 0.5 0.3 - 1.0 K/uL    Eos # 0.4 0.0 - 0.5 K/uL    Baso # 0.04 0.00 - 0.20 K/uL    Gran% 40.9 38.0 - 73.0 %    Lymph% 43.9 18.0 - 48.0 %    Mono% 8.1 4.0 - 15.0 %    Eosinophil% 6.3 0.0 - 8.0 %    Basophil% 0.6 0.0 - 1.9 %    Differential Method Automated    Comprehensive metabolic panel   Result Value Ref Range    Sodium 144 136 - 145 mmol/L    Potassium 3.9 3.5 - 5.1 mmol/L    Chloride 106 95 - 110 mmol/L    CO2 27 23 - 29 mmol/L    Glucose 72 70 - 110 mg/dL    BUN, Bld 7 6 - 20 mg/dL    Creatinine 0.9 0.5 - 1.4 mg/dL    Calcium 9.8 8.7 - 10.5 mg/dL    Total Protein 8.6 (H) 6.0 - 8.4 g/dL    Albumin 4.1 3.5 - 5.2 g/dL    Total Bilirubin 0.5 0.1 - 1.0 mg/dL    Alkaline Phosphatase 88 55 - 135 U/L    AST 15 10 - 40 U/L    ALT 10 10 - 44 U/L    Anion Gap 11 8 - 16 mmol/L    eGFR if African American >60.0 >60 mL/min/1.73 m^2    eGFR if non African American >60.0 >60 mL/min/1.73 m^2   Troponin I #1   Result Value Ref Range    Troponin I <0.006 0.000 - 0.026 ng/mL   B-Type natriuretic peptide (BNP)   Result Value Ref Range    BNP 35 0 - 99 pg/mL   Ethanol   Result Value Ref Range    Alcohol, Medical, Serum <10 <10 mg/dL         Imaging Results          CT Head Without Contrast (Final result)  Result time 09/26/18 13:59:49    Final result by Adi Florentino III, MD (09/26/18 13:59:49)                 Impression:      Stable CT brain compared to April.  Atrophy with postop changes and encephalomalacia changes again noted bilaterally.  No bleed or other acute abnormality suggested.    All CT scans at this facility are performed  using dose modulation techniques as appropriate to performed exam including the following:  automated exposure control; adjustment of mA and/or kV according to the patients size (this includes techniques or standardized protocols for targeted exams where dose is matched to indication/reason for exam: i.e. extremities or head);  iterative reconstruction  technique.      Electronically signed by: Adi Florentino MD  Date:    09/26/2018  Time:    13:59             Narrative:    EXAMINATION:  CT HEAD WITHOUT CONTRAST    CLINICAL HISTORY:  Unspecified convulsionsseizure;    TECHNIQUE:  Standard non contrast CT scan of the brain.    COMPARISON:  April    FINDINGS:  There are again noted generalized changes of atrophy both supra and infratentorial.  Patchy changes of low attenuation again noted in the periventricular and subcortical white matter.  Multiple zones of encephalomalacia again noted in the occluding the left temporal and parietal regions as well as the right frontal region.  Ventricular prominence felt to be upper limits of normal for this degree of atrophy.  There is no hemorrhage, mass lesion, or definite hydrocephalus.  No midline shift.  No acute/depressed skull fracture.  Postop changes along the skull again noted.                               X-Ray Chest AP Portable (Final result)  Result time 09/26/18 13:47:50    Final result by YANCY Siddiqi Sr., MD (09/26/18 13:47:50)                 Impression:      1. There is no focal pulmonary infiltrate visualized.  2. There is persistent deformity of the visualized portion of the right humerus. There is partial visualization of an intramedullary nail in the right humerus.  .      Electronically signed by: Ciaran Siddiqi MD  Date:    09/26/2018  Time:    13:47             Narrative:    EXAMINATION:  XR CHEST AP PORTABLE    CLINICAL HISTORY:  seizure;    COMPARISON:  08/03/2018    FINDINGS:  The size of the heart is normal.  There is no focal pulmonary infiltrate visualized.  There is no pneumothorax.  The costophrenic angles are sharp.  There is persistent deformity of the visualized portion of the right humerus.  There is partial visualization of an intramedullary nail in the right humerus.                                Medications   aspirin tablet 325 mg (325 mg Oral Given 9/26/18 1407)   sodium chloride  "0.9% bolus 500 mL (0 mLs Intravenous Stopped 18)   levETIRAcetam in NaCl (iso-os) IVPB 1,000 mg (0 mg Intravenous Stopped 18)       3:22 PM - Re-evaluation: The patient is resting comfortably and is in no acute distress. He states that his symptoms have improved after treatment within ER.  Pt states his symptoms have resolved.  Pt didn't feel like getting up to use the bathroom and urinated right on the floor.  When asked about this, pt looked at me and laughed.  He then stated, "I told you I needed to go to the bathroom. Since you didn't do anything I peed on the floor."  Pt has no focal deficits and is able to amulate without assistance.  Pt states he occasionally drinks etoh and doesn't take his medications as directed. Discussed test results, shared treatment plan, specific conditions for return, and importance of follow up with patient and family.  He understands and agrees with the plan as discussed. Answered  his questions at this time. He has remained hemodynamically stable throughout the ED course and is appropriate for discharge home.     SEIZURE PRECAUTION  [facility]- Department of Emergency Medicine has discussed and alerted you to the danger of driving, after being diagnosed with a Seizure or possible Seizures Disorder.  The situation of driving and Seizure Disorder is very dangerous for you as the patient, as well as others on the road. It was explained to you that seizure medication does not guarantee the full control of seizure episodes. Seizures can occur at any time and anywhere and in any situation.  Your Physician discussed with you Seizure Safety Precautions, and you were informed that patients with Seizure Disorders should avoid the followin. Unattended Swimming.  2. Working in the fireplace.  3. Climbing high ladders, steps, and trees.  4. Working with sharp cutting machines and tools, or any other potentially harmful activity.  You understand that the " [facility] does not require the doctor to report Seizure Disorders to the Department of Motor Vehicles. However, you are aware that you, as a patient with a Seizure Disorder, are personally obligated to inform the doctor and the Motor Vehicle Department if a situation arises.      Pre-hypertension/Hypertension: The pt has been informed that they may have pre-hypertension or hypertension based on a blood pressure reading in the ED. I recommend that the pt call the PCP listed on their discharge instructions or a physician of their choice this week to arrange f/u for further evaluation of possible pre-hypertension or hypertension.     Smooth Spring was given a handout which discussed their disease process, precautions, and instructions for follow-up and therapy.    Follow-up Information     Sumeet Koo MD. Schedule an appointment as soon as possible for a visit in 1 week.    Specialty:  Family Medicine  Contact information:  [address] [address] [address]  [phone]             [facility].    Specialty:  Emergency Medicine  Why:  As needed, If symptoms worsen  Contact information:  [address] [address] [id number] [phone]                    Medication List      ASK your doctor about these medications    amLODIPine 10 MG tablet  Commonly known as:  NORVASC  Take 1 tablet (10 mg total) by mouth once daily.     divalproex  MG 24 hr tablet  Commonly known as:  DEPAKOTE ER  TAKE 1 TABLET BY MOUTH TWICE A DAY     haloperidol 5 MG tablet  Commonly known as:  HALDOL  Take 1 tablet (5 mg total) by mouth 2 (two) times daily.     levETIRAcetam 1000 MG tablet  Commonly known as:  KEPPRA  Take 1.5 tablets (1,500 mg total) by mouth 2 (two) times daily.     meloxicam 15 MG tablet  Commonly known as:  MOBIC  TAKE 1 TABLET (15 MG TOTAL) BY MOUTH DAILY AS NEEDED FOR PAIN.     OLANZapine 10 MG tablet  Commonly known as:  ZyPREXA  Take 0.5 tablets (5 mg total) by mouth 2  (two) times daily.     omeprazole 20 MG capsule  Commonly known as:  PRILOSEC     propranolol 10 MG tablet  Commonly known as:  INDERAL  Take 1 tablet (10 mg total) by mouth once daily.     VIMPAT 100 mg Tab  Generic drug:  lacosamide  TAKE 1 TABLET BY MOUTH TWICE A DAY               ED Diagnosis  1. Noncompliance with medications    2. Seizure                             Clinical Impression:   The primary encounter diagnosis was Noncompliance with medications. A diagnosis of Seizure was also pertinent to this visit.      Disposition:   Disposition: Discharged  Condition: Stable                        Prudencio Thomas Jr., MD  09/26/18 8462

## 2018-09-26 NOTE — DISCHARGE INSTRUCTIONS
SEIZURE PRECAUTION  [facility]- Department of Emergency Medicine has discussed and alerted you to the danger of driving, after being diagnosed with a Seizure or possible Seizures Disorder.  The situation of driving and Seizure Disorder is very dangerous for you as the patient, as well as others on the road. It was explained to you that seizure medication does not guarantee the full control of seizure episodes. Seizures can occur at any time and anywhere and in any situation.  Your Physician discussed with you Seizure Safety Precautions, and you were informed that patients with Seizure Disorders should avoid the following:  Unattended Swimming.  Working in the fireplace.  Climbing high ladders, steps, and trees.  Working with sharp cutting machines and tools, or any other potentially harmful activity.  You understand that the [facility] does not require the doctor to report Seizure Disorders to the Department of Motor Vehicles. However, you are aware that you, as a patient with a Seizure Disorder, are personally obligated to inform the doctor and the Motor Vehicle Department if a situation arises.

## 2018-10-01 ENCOUNTER — OUTPATIENT CASE MANAGEMENT (OUTPATIENT)
Dept: ADMINISTRATIVE | Facility: OTHER | Age: 58
End: 2018-10-01

## 2018-10-01 NOTE — PROGRESS NOTES
Thank you for the referral.  Patient has been assigned to Roma Ojeda LMSW for low risk screening for Outpatient Case Management.     Reason for referral:   Noncompliance with medications  Parkinsons disease  Seizure    Please contact [facility] at ext. [phone] with any questions.    Eve Correia    [facility]

## 2018-10-02 ENCOUNTER — OUTPATIENT CASE MANAGEMENT (OUTPATIENT)
Dept: ADMINISTRATIVE | Facility: OTHER | Age: 58
End: 2018-10-02

## 2018-10-02 NOTE — PROGRESS NOTES
LMSW contacted patient regarding referral . Patient gave permission to PCA Jaime Hermosillo to speak on his behalf. PCA reports family  seeking assistance with obtaining a letter from PCP to obtain [facility]. PCA reports she is employed by [employer]. LMSW ask PCA if [facility]. PCA reports yes. LMSW encouraged PCA to advise family to ask for additional hours at next appointment. Patient currently receives assistance through [employer].

## 2018-10-03 ENCOUNTER — OFFICE VISIT (OUTPATIENT)
Dept: PODIATRY | Facility: CLINIC | Age: 58
End: 2018-10-03
Payer: MEDICARE

## 2018-10-03 VITALS
SYSTOLIC BLOOD PRESSURE: 121 MMHG | DIASTOLIC BLOOD PRESSURE: 75 MMHG | BODY MASS INDEX: 20.21 KG/M2 | HEART RATE: 58 BPM | WEIGHT: 141.13 LBS | HEIGHT: 70 IN

## 2018-10-03 DIAGNOSIS — L60.2 ONYCHOGRYPHOSIS: Primary | ICD-10-CM

## 2018-10-03 DIAGNOSIS — G20.A1 PARKINSONS DISEASE: ICD-10-CM

## 2018-10-03 PROCEDURE — 3078F DIAST BP <80 MM HG: CPT | Mod: CPTII,,, | Performed by: PODIATRIST

## 2018-10-03 PROCEDURE — 99212 OFFICE O/P EST SF 10 MIN: CPT | Mod: PBBFAC,PO | Performed by: PODIATRIST

## 2018-10-03 PROCEDURE — 3074F SYST BP LT 130 MM HG: CPT | Mod: CPTII,,, | Performed by: PODIATRIST

## 2018-10-03 PROCEDURE — 99203 OFFICE O/P NEW LOW 30 MIN: CPT | Mod: S$PBB,,, | Performed by: PODIATRIST

## 2018-10-03 PROCEDURE — 99999 PR PBB SHADOW E&M-EST. PATIENT-LVL II: CPT | Mod: PBBFAC,,, | Performed by: PODIATRIST

## 2018-10-03 PROCEDURE — 3008F BODY MASS INDEX DOCD: CPT | Mod: CPTII,,, | Performed by: PODIATRIST

## 2018-10-03 NOTE — PROGRESS NOTES
[facility]  PODIATRIC MEDICINE AND SURGERY      CHIEF COMPLAINT   Chief Complaint   Patient presents with    Routine Foot Care     PCP Dr. Koo 08/03/18 [facility]         HPI:    Smooth Spring is a 58 y.o. male presenting to podiatry clinic with complaint of fungal infection on toenails. Pt accompanied with care giver who states unable to trim nails due to dystrophy. They request nail care and Patient inquires about available treatment options. No further pedal complaints.      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    Weakness        PROBLEM LIST  Patient Active Problem List    Diagnosis Date Noted    Noncompliance with medications 09/26/2018    Encounter for lipid screening for cardiovascular disease 08/03/2018    Weight loss, unintentional 05/03/2018    Marijuana use, continuous 05/03/2018    Chronic right shoulder pain 12/12/2017    Screen for colon cancer 12/05/2017    Delayed immunizations 12/05/2017    Essential hypertension 12/05/2017    Gastroesophageal reflux disease 11/23/2017    Burn 11/23/2017    Encounter for long-term (current) use of medications 11/20/2017    Parkinsons disease 06/27/2017    Arthropathy of right shoulder 06/27/2017    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 90 tablet 1    divalproex ER (DEPAKOTE ER) 250 MG 24 hr tablet TAKE 1 TABLET BY MOUTH TWICE A  tablet 0    haloperidol (HALDOL) 5 MG tablet Take 1 tablet (5 mg total) by mouth 2 (two) times daily. 180 tablet 1    levETIRAcetam (KEPPRA) 1000 MG tablet Take 1.5 tablets (1,500 mg total) by mouth  "2 (two) times daily. 60 tablet 1    meloxicam (MOBIC) 15 MG tablet TAKE 1 TABLET (15 MG TOTAL) BY MOUTH DAILY AS NEEDED FOR PAIN. 60 tablet 0    OLANZapine (ZYPREXA) 10 MG tablet Take 0.5 tablets (5 mg total) by mouth 2 (two) times daily. 90 tablet 1    omeprazole (PRILOSEC) 20 MG capsule Take 20 mg by mouth once daily.  3    propranolol (INDERAL) 10 MG tablet Take 1 tablet (10 mg total) by mouth once daily. 90 tablet 3    VIMPAT 100 mg Tab TAKE 1 TABLET BY MOUTH TWICE A  tablet 0     No current facility-administered medications on file prior to visit.        PSH     Past Surgical History:   Procedure Laterality Date    BRAIN SURGERY      KNEE SURGERY      REVISION OF SCAR ON FACE/HEAD      SHOULDER SURGERY          ALL  Review of patient's allergies indicates:  No Known Allergies    SOC     Social History     Tobacco Use    Smoking status: Current Every Day Smoker     Packs/day: 1.00     Years: 32.00     Pack years: 32.00     Types: Cigarettes    Smokeless tobacco: Never Used   Substance Use Topics    Alcohol use: No    Drug use: No         Family HX    Family History   Problem Relation Age of Onset    Kidney disease Mother     Diabetes Mother             REVIEW OF SYSTEMS  General: Denies any fever or chills  Chest: Denies shortness of breath, wheezing, coughing, or sputum production  Heart: Denies chest pain, cold extremities, orthopenia, or reduced exercise tolerance  As noted above and per history of current illness above, otherwise negative in the remainder of the 14 systems.      PHYSICAL EXAM:      Vitals:    10/03/18 0956   BP: 121/75   Pulse: (!) 58   Weight: 64 kg (141 lb 1.5 oz)   Height: 5' 10" (1.778 m)       General: This patient is well-developed, well-nourished and appears stated age, well-oriented to person, place and time, and cooperative and pleasant on today's visit    LOWER EXTREMITY PHYSICAL EXAM  VASCULAR  Dorsalis pedis and posterior tibial pulses palpable 2/4 " bilaterally.   Capillary refill time immediate to the toes.   Feet are warm to the touch. Skin temperature warm to warm from proximally to distally   There is mild gross lower extremity edema.    DERMATOLOGIC  There are thickened discolored, elongated mycotic nails suggestive of onychomycosis   Skin moist with healthy texture and turgor.  There are no open ulcerations, lacerations, or fissures to bilateral foot and ankle regions. There are no signs of infection as there is no erythema, no proximal-extending lymphangiitis, no fluctuance, or crepitus noted on palpation to bilateral foot and ankle regions.   There is no interdigital maceration.   There are hyperkeratotic lesions noted to feet.     NEUROLOGIC  Epicritic sensation is intact as the patient is able to sense light touch to bilateral foot and ankle regions.   Achilles and patellar deep tendon reflexes intact  Babinski reflex absent    ORTHOPEDIC/BIOMECHANICAL  No symptomatic structural abnormalities noted  Muscle strength AT/EHL/EDL/PT: 5/5; Achilles/Gastroc/Soleus: 5/5; PB/PL: 5/5 Muscle tone is normal.  Ankle joint ROM INTACT DF/PF, non-crepitus      ASSESSMENT   Onychogryphosis    Parkinsons disease        PLAN    1. Patient was educated about clinical and imaging findings, and verbalizes understanding of above.  2. I Discussed treatment options for nail fungus.   -I explained that fungus lives in a warm dark moist environment and therefore patient should make every attempt to keep feet clean and dry.  We discussed drying feet thoroughly after shower particularly between the toes and then applying powder between the toes and in the shoes. I also discussed to disinfect shower tub, discard old shoes, and disinfect current shoes with antiseptic      -For fungal toenails I recommend vicks vapor rub daily and routine debridements. I discussed PROC B program in the future. As a courtesy, With patient's permission, nails were aggressively reduced and debrided x  10 to their soft tissue attachment mechanically and with electric , removing all offending nail and debris. Patient relates relief following the procedure.       Report Electronically Signed By:     Geovanna Viera DPM   Podiatry  [facility]  10/3/2018

## 2018-10-10 DIAGNOSIS — R56.9 SEIZURES: Chronic | ICD-10-CM

## 2018-10-10 RX ORDER — LEVETIRACETAM 1000 MG/1
1500 TABLET ORAL 2 TIMES DAILY
Qty: 60 TABLET | Refills: 1 | Status: SHIPPED | OUTPATIENT
Start: 2018-10-10 | End: 2018-12-09 | Stop reason: SDUPTHER

## 2018-10-22 ENCOUNTER — PATIENT OUTREACH (OUTPATIENT)
Dept: ADMINISTRATIVE | Facility: HOSPITAL | Age: 58
End: 2018-10-22

## 2018-10-22 NOTE — LETTER
October 22, 2018        Smooth Spring  [address] [facility] [facility] [address] [address]      Dear Mr. Spring,    You have an upcoming appointment with Sumeet Koo MD on 11/05/18.      Your chart is indicating you may be due for the following and I will be happy to assist you in scheduling any needed appointments:  Health Maintenance Due   Topic    Lipid Panel     Influenza Vaccine           If you have had any of the above done at another facility, please bring the records or information with you so that your record at [facility] will be complete.    We will be happy to assist you with scheduling any necessary appointments or you may contact the [facility] appointment desk at [phone] to schedule at your convenience.     Thank you for choosing [facility] for your healthcare needs,    Francoise PIERSON LPN Care Coordinator  [facility]  [phone]

## 2018-10-30 ENCOUNTER — OFFICE VISIT (OUTPATIENT)
Dept: INTERNAL MEDICINE | Facility: CLINIC | Age: 58
End: 2018-10-30
Payer: MEDICARE

## 2018-10-30 ENCOUNTER — LAB VISIT (OUTPATIENT)
Dept: LAB | Facility: HOSPITAL | Age: 58
End: 2018-10-30
Attending: FAMILY MEDICINE
Payer: MEDICARE

## 2018-10-30 VITALS
RESPIRATION RATE: 16 BRPM | DIASTOLIC BLOOD PRESSURE: 70 MMHG | OXYGEN SATURATION: 97 % | WEIGHT: 140.63 LBS | SYSTOLIC BLOOD PRESSURE: 128 MMHG | TEMPERATURE: 98 F | HEIGHT: 64 IN | BODY MASS INDEX: 24.01 KG/M2 | HEART RATE: 83 BPM

## 2018-10-30 DIAGNOSIS — Z13.6 ENCOUNTER FOR LIPID SCREENING FOR CARDIOVASCULAR DISEASE: ICD-10-CM

## 2018-10-30 DIAGNOSIS — Z13.220 ENCOUNTER FOR LIPID SCREENING FOR CARDIOVASCULAR DISEASE: ICD-10-CM

## 2018-10-30 DIAGNOSIS — R56.9 SEIZURE: Primary | Chronic | ICD-10-CM

## 2018-10-30 DIAGNOSIS — E55.9 VITAMIN D DEFICIENCY: ICD-10-CM

## 2018-10-30 LAB
25(OH)D3+25(OH)D2 SERPL-MCNC: 26 NG/ML
CHOLEST SERPL-MCNC: 151 MG/DL
CHOLEST/HDLC SERPL: 3.7 {RATIO}
HDLC SERPL-MCNC: 41 MG/DL
HDLC SERPL: 27.2 %
LDLC SERPL CALC-MCNC: 99 MG/DL
NONHDLC SERPL-MCNC: 110 MG/DL
TRIGL SERPL-MCNC: 55 MG/DL

## 2018-10-30 PROCEDURE — 3008F BODY MASS INDEX DOCD: CPT | Mod: CPTII,,, | Performed by: FAMILY MEDICINE

## 2018-10-30 PROCEDURE — 99213 OFFICE O/P EST LOW 20 MIN: CPT | Mod: PBBFAC,PO | Performed by: FAMILY MEDICINE

## 2018-10-30 PROCEDURE — 36415 COLL VENOUS BLD VENIPUNCTURE: CPT | Mod: PO

## 2018-10-30 PROCEDURE — 3078F DIAST BP <80 MM HG: CPT | Mod: CPTII,,, | Performed by: FAMILY MEDICINE

## 2018-10-30 PROCEDURE — 3074F SYST BP LT 130 MM HG: CPT | Mod: CPTII,,, | Performed by: FAMILY MEDICINE

## 2018-10-30 PROCEDURE — 99214 OFFICE O/P EST MOD 30 MIN: CPT | Mod: S$PBB,,, | Performed by: FAMILY MEDICINE

## 2018-10-30 PROCEDURE — 90686 IIV4 VACC NO PRSV 0.5 ML IM: CPT | Mod: PBBFAC,PO

## 2018-10-30 PROCEDURE — 82306 VITAMIN D 25 HYDROXY: CPT

## 2018-10-30 PROCEDURE — 99999 PR PBB SHADOW E&M-EST. PATIENT-LVL III: CPT | Mod: PBBFAC,,, | Performed by: FAMILY MEDICINE

## 2018-10-30 PROCEDURE — 80061 LIPID PANEL: CPT

## 2018-10-30 NOTE — PROGRESS NOTES
Subjective:       Patient ID: Smooth Spring is a 58 y.o. male.    Chief Complaint: Annual Exam    Pt here today for examination for housebound status or permanent need for regular aid and attendance.  [facility] for [facility].  Last seizure was 2 weeks ago      Seizures    This is a chronic problem. The current episode started more than 1 week ago. The problem has not changed since onset.There were more than 10 seizures. The most recent episode lasted more than 5 minutes. Associated symptoms include diarrhea. Pertinent negatives include no visual disturbance, no neck stiffness, no chest pain and no vomiting. Characteristics include eye blinking, bladder incontinence, rhythmic jerking and loss of consciousness. Characteristics do not include bowel incontinence or bit tongue. The episode was witnessed. The seizures continued in the ED. The seizure(s) had left-sided and right-sided focality. Possible causes include missed seizure meds. There has been no fever. There were no medications administered prior to arrival.     Review of Systems   Eyes: Negative for visual disturbance.   Cardiovascular: Negative for chest pain.   Gastrointestinal: Positive for diarrhea. Negative for bowel incontinence and vomiting.   Genitourinary: Positive for bladder incontinence.   Neurological: Positive for seizures and loss of consciousness.       Objective:      Physical Exam   Constitutional: He is oriented to person, place, and time. He appears well-developed and well-nourished. No distress.   HENT:   Head: Normocephalic and atraumatic.   Pulmonary/Chest: Effort normal and breath sounds normal. No respiratory distress. He has no wheezes.   Neurological: He is alert and oriented to person, place, and time. No sensory deficit. He exhibits normal muscle tone.   Rhythmic lue tremor.   Skin: Skin is warm and dry. No rash noted. He is not diaphoretic. No erythema.   Nursing note and vitals reviewed.       Assessment:       1. Seizure    2. Encounter for lipid screening for cardiovascular disease    3. Vitamin D deficiency        Plan:     Problem List Items Addressed This Visit        Neuro    Seizure - Primary (Chronic)    Current Assessment & Plan     See scanned document for homebound assistance.  Pt seen for greater than 25 min face to face. Greater than 50% of the time in the room was spent on counseling and coordination of care.              Cardiac/Vascular    Encounter for lipid screening for cardiovascular disease    Relevant Orders    Lipid panel       Endocrine    Vitamin D deficiency    Relevant Orders    Vitamin D

## 2018-10-30 NOTE — ASSESSMENT & PLAN NOTE
See scanned document for homebound assistance.  Pt seen for greater than 25 min face to face. Greater than 50% of the time in the room was spent on counseling and coordination of care.

## 2018-11-05 PROBLEM — Z13.220 ENCOUNTER FOR LIPID SCREENING FOR CARDIOVASCULAR DISEASE: Status: RESOLVED | Noted: 2018-08-03 | Resolved: 2018-11-05

## 2018-11-05 PROBLEM — Z13.6 ENCOUNTER FOR LIPID SCREENING FOR CARDIOVASCULAR DISEASE: Status: RESOLVED | Noted: 2018-08-03 | Resolved: 2018-11-05

## 2018-11-17 DIAGNOSIS — R56.9 SEIZURES: Chronic | ICD-10-CM

## 2018-11-19 RX ORDER — LACOSAMIDE 100 MG/1
TABLET, FILM COATED ORAL
Qty: 180 TABLET | Refills: 0 | Status: SHIPPED | OUTPATIENT
Start: 2018-11-19 | End: 2019-01-15 | Stop reason: SDUPTHER

## 2018-12-07 ENCOUNTER — HOSPITAL ENCOUNTER (EMERGENCY)
Facility: HOSPITAL | Age: 58
Discharge: HOME | End: 2018-12-07
Attending: EMERGENCY MEDICINE
Payer: MEDICARE

## 2018-12-07 VITALS
RESPIRATION RATE: 20 BRPM | HEIGHT: 64 IN | OXYGEN SATURATION: 97 % | TEMPERATURE: 98 F | HEART RATE: 77 BPM | SYSTOLIC BLOOD PRESSURE: 128 MMHG | DIASTOLIC BLOOD PRESSURE: 73 MMHG | WEIGHT: 130 LBS | BODY MASS INDEX: 22.2 KG/M2

## 2018-12-07 DIAGNOSIS — J18.9 PNEUMONIA OF LEFT LOWER LOBE DUE TO INFECTIOUS ORGANISM: Primary | ICD-10-CM

## 2018-12-07 DIAGNOSIS — R52 PAIN: ICD-10-CM

## 2018-12-07 DIAGNOSIS — R05.9 COUGH: ICD-10-CM

## 2018-12-07 DIAGNOSIS — M25.519 SHOULDER PAIN: ICD-10-CM

## 2018-12-07 LAB
ALBUMIN SERPL BCP-MCNC: 3.9 G/DL
ALP SERPL-CCNC: 85 U/L
ALT SERPL W/O P-5'-P-CCNC: 7 U/L
ANION GAP SERPL CALC-SCNC: 10 MMOL/L
APAP SERPL-MCNC: <3 UG/ML
AST SERPL-CCNC: 12 U/L
BASOPHILS # BLD AUTO: 0.01 K/UL
BASOPHILS NFR BLD: 0.2 %
BILIRUB SERPL-MCNC: 0.4 MG/DL
BUN SERPL-MCNC: 8 MG/DL
CALCIUM SERPL-MCNC: 9.5 MG/DL
CHLORIDE SERPL-SCNC: 105 MMOL/L
CO2 SERPL-SCNC: 24 MMOL/L
CREAT SERPL-MCNC: 0.8 MG/DL
DIFFERENTIAL METHOD: ABNORMAL
EOSINOPHIL # BLD AUTO: 0.3 K/UL
EOSINOPHIL NFR BLD: 6.9 %
ERYTHROCYTE [DISTWIDTH] IN BLOOD BY AUTOMATED COUNT: 14.5 %
EST. GFR  (AFRICAN AMERICAN): >60 ML/MIN/1.73 M^2
EST. GFR  (NON AFRICAN AMERICAN): >60 ML/MIN/1.73 M^2
ETHANOL SERPL-MCNC: <10 MG/DL
GLUCOSE SERPL-MCNC: 88 MG/DL
HCT VFR BLD AUTO: 40.7 %
HGB BLD-MCNC: 14 G/DL
LYMPHOCYTES # BLD AUTO: 2.4 K/UL
LYMPHOCYTES NFR BLD: 50.1 %
MCH RBC QN AUTO: 30.2 PG
MCHC RBC AUTO-ENTMCNC: 34.4 G/DL
MCV RBC AUTO: 88 FL
MONOCYTES # BLD AUTO: 0.4 K/UL
MONOCYTES NFR BLD: 7.7 %
NEUTROPHILS # BLD AUTO: 1.7 K/UL
NEUTROPHILS NFR BLD: 34.9 %
PLATELET # BLD AUTO: 61 K/UL
PMV BLD AUTO: 10.3 FL
POTASSIUM SERPL-SCNC: 4.4 MMOL/L
PROT SERPL-MCNC: 7.5 G/DL
RBC # BLD AUTO: 4.63 M/UL
SALICYLATES SERPL-MCNC: <5 MG/DL
SODIUM SERPL-SCNC: 139 MMOL/L
WBC # BLD AUTO: 4.81 K/UL

## 2018-12-07 PROCEDURE — 80053 COMPREHEN METABOLIC PANEL: CPT

## 2018-12-07 PROCEDURE — 25000003 PHARM REV CODE 250: Performed by: EMERGENCY MEDICINE

## 2018-12-07 PROCEDURE — 80320 DRUG SCREEN QUANTALCOHOLS: CPT

## 2018-12-07 PROCEDURE — 85025 COMPLETE CBC W/AUTO DIFF WBC: CPT

## 2018-12-07 PROCEDURE — 99284 EMERGENCY DEPT VISIT MOD MDM: CPT | Mod: 25

## 2018-12-07 PROCEDURE — 96372 THER/PROPH/DIAG INJ SC/IM: CPT

## 2018-12-07 PROCEDURE — 80307 DRUG TEST PRSMV CHEM ANLYZR: CPT

## 2018-12-07 PROCEDURE — 80329 ANALGESICS NON-OPIOID 1 OR 2: CPT

## 2018-12-07 PROCEDURE — 63600175 PHARM REV CODE 636 W HCPCS: Performed by: EMERGENCY MEDICINE

## 2018-12-07 RX ORDER — CEFTRIAXONE 1 G/1
1 INJECTION, POWDER, FOR SOLUTION INTRAMUSCULAR; INTRAVENOUS
Status: COMPLETED | OUTPATIENT
Start: 2018-12-07 | End: 2018-12-07

## 2018-12-07 RX ORDER — DOXYCYCLINE 100 MG/1
100 CAPSULE ORAL 2 TIMES DAILY
Qty: 20 CAPSULE | Refills: 0 | Status: SHIPPED | OUTPATIENT
Start: 2018-12-07 | End: 2018-12-17

## 2018-12-07 RX ORDER — DOXYCYCLINE HYCLATE 100 MG
100 TABLET ORAL
Status: COMPLETED | OUTPATIENT
Start: 2018-12-07 | End: 2018-12-07

## 2018-12-07 RX ADMIN — CEFTRIAXONE SODIUM 1 G: 1 INJECTION, POWDER, FOR SOLUTION INTRAMUSCULAR; INTRAVENOUS at 07:12

## 2018-12-07 RX ADMIN — DOXYCYCLINE HYCLATE 100 MG: 100 TABLET, COATED ORAL at 07:12

## 2018-12-07 NOTE — ED PROVIDER NOTES
"   History     Chief Complaint   Patient presents with    Altered Mental Status     EMS reports that pt intoxicated chasing mother in yard and having hallucinations, pt states "I just wanted to come see y'all"--no complaints at this time       Review of patient's allergies indicates:  No Known Allergies    History of Present Illness   HPI    12/7/2018, 5:17 PM  The history is provided by the patient and CHELSEY    Smooth Spring is a 58 y.o. male presenting to the ED for patient intoxicated, chasing mother in yd and having hallucinations.  Patient denies any complaints other than right shoulder pain which has been ongoing for months.  Otherwise patient states that he just wanted to come see all.  He want to be around good people.  Patient denies any fever, chills, chest pain, chest pressure, shortness of breath, difficulty breathing, trauma, recent alcohol ingestion.  Patient denies any suicidal ideation, homicidal ideation.    Further history limited.  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    Weakness        Past Surgical History:  Past Surgical History:   Procedure Laterality Date    BRAIN SURGERY      KNEE SURGERY      REVISION OF SCAR ON FACE/HEAD      SHOULDER SURGERY           Family History:  Family History   Problem Relation Age of Onset    Kidney disease Mother     Diabetes Mother        Social History:  Social History     Tobacco Use    Smoking status: Current Every Day Smoker     Packs/day: 1.00     Years: 32.00     Pack years: 32.00     Types: Cigarettes    Smokeless tobacco: Never Used   Substance and Sexual Activity    Alcohol use: No    Drug use: No " "   Sexual activity: No        Review of Systems   Review of Systems   Constitutional: Negative for fever.   HENT: Negative for sore throat.    Respiratory: Negative for shortness of breath.    Cardiovascular: Negative for chest pain.   Gastrointestinal: Negative for nausea.   Genitourinary: Negative for dysuria.   Musculoskeletal: Positive for arthralgias (Right shoulder). Negative for back pain.   Skin: Negative for rash.   Neurological: Negative for weakness.   Hematological: Does not bruise/bleed easily.          Physical Exam     Initial Vitals [12/07/18 1703]   BP Pulse Resp Temp SpO2   126/74 82 16 98 °F (36.7 °C) 95 %      MAP       --          Physical Exam    Nursing Notes and Vital Signs Reviewed.  Constitutional: Patient is in no acute distress. Well-developed and well-nourished.  Head: Atraumatic. Normocephalic.  Eyes: PERRL. EOM intact. Conjunctivae are not pale. No scleral icterus.  ENT: Mucous membranes are moist. Oropharynx is clear and symmetric.    Neck: Supple. Full ROM. No lymphadenopathy.  Cardiovascular: Regular rate. Regular rhythm. No murmurs, rubs, or gallops. Distal pulses are 2+ and symmetric.  Pulmonary/Chest: No respiratory distress. Clear to auscultation bilaterally. No wheezing or rales.  Abdominal: Soft and non-distended.  There is no tenderness.  No rebound, guarding, or rigidity. Good bowel sounds.  Genitourinary: No CVA tenderness  Musculoskeletal: Moves all extremities. No obvious deformities. No edema. No calf tenderness.  Skin: Warm and dry.  Neurological:  Alert, awake, and appropriate.  Normal speech.  No acute focal neurological deficits are appreciated.  Psychiatric: Normal affect. Good eye contact. Appropriate in content.     ED Course   Procedures  ED Vital Signs:  Vitals:    12/07/18 1703 12/07/18 1940   BP: 126/74 128/73   Pulse: 82 77   Resp: 16 20   Temp: 98 °F (36.7 °C) 98.1 °F (36.7 °C)   TempSrc: Oral Oral   SpO2: 95% 97%   Weight: 59 kg (130 lb)    Height: 5' 4" " (1.626 m)        Abnormal Lab Results:  Labs Reviewed   COMPREHENSIVE METABOLIC PANEL - Abnormal; Notable for the following components:       Result Value    ALT 7 (*)     All other components within normal limits   CBC W/ AUTO DIFFERENTIAL - Abnormal; Notable for the following components:    Platelets 61 (*)     Gran # (ANC) 1.7 (*)     Gran% 34.9 (*)     Lymph% 50.1 (*)     All other components within normal limits   ACETAMINOPHEN LEVEL - Abnormal; Notable for the following components:    Acetaminophen (Tylenol), Serum <3.0 (*)     All other components within normal limits   SALICYLATE LEVEL - Abnormal; Notable for the following components:    Salicylate Lvl <5.0 (*)     All other components within normal limits   ALCOHOL,MEDICAL (ETHANOL)        All Lab Results:               Imaging Results:  Imaging Results          X-Ray Shoulder Complete 2 View Right (Final result)  Result time 12/07/18 18:58:07    Final result by Adi Chairez MD (12/07/18 18:58:07)                 Impression:      Chronic right shoulder deformity.  No acute abnormality identified.  Remote open reduction internal fixation with intramedullary lucie/nail.      Electronically signed by: Adi Chairez MD  Date:    12/07/2018  Time:    18:58             Narrative:    EXAMINATION:  XR SHOULDER COMPLETE 2 OR MORE VIEWS RIGHT    CLINICAL HISTORY:  Pain, unspecified    TECHNIQUE:  Two or three views of the right shoulder were performed.    COMPARISON:  None    FINDINGS:  Remote shoulder fracture with deformity of the proximal humeral head and shaft.  Intramedullary lucie present.  No acute injury/fracture identified.  No interval change compared to prior shoulder dated 12/12/2017.                               X-Ray Chest PA And Lateral (Final result)  Result time 12/07/18 18:55:33    Final result by Adi Chairez MD (12/07/18 18:55:33)                 Impression:      Patchy infiltrates/pneumonia at the left lung base.      Electronically signed  by: Adi Chairez MD  Date:    12/07/2018  Time:    18:55             Narrative:    EXAMINATION:  XR CHEST PA AND LATERAL    CLINICAL HISTORY:  Cough    TECHNIQUE:  PA and lateral views of the chest were performed.    COMPARISON:  None    FINDINGS:  Patchy increased density at the left lung base consistent with a left lower lobe infiltrate/pneumonia.  Coarsening of bronchovascular markings/COPD.    The cardiac silhouette is upper limits of normal in size..  Mild atherosclerotic calcifications of the thoracic aorta.                                   The Emergency Provider reviewed the vital signs and test results, which are outlined above.     ED Discussion     18:00 Care to Dr. Jamil.      6:25 PM Assumed care at 6:00 p.m..  Case reviewed, patient examined interviewed, stable, await further data and clinical course.  Agree with all evaluation and management as per Dr. White.    6:42 PM Stable, calm, cooperative, no new problems. Awaiting further data.    7:44 PM Stable, at baseline per family, cousin is at bedside.  They do report that he has been coughing with some sputum production for a few days.  No fever or dyspnea.  Room air saturation 96%, vital signs normal, no tachycardia, no dyspnea or tachypnea.  Data are reviewed.  He is clinically acceptable for outpatient treatment of uncomplicated left lower lobe pneumonia, counseled family in detail.  They confirmed that they will help insure that he has his prescriptions filled and continues antibiotics beginning tomorrow morning, also close office follow-up with primary care and to return here if worse in any way. Mild basilar rhonchi on exam with good air entry throughout.    ED Medication(s):  Medications   cefTRIAXone injection 1 g (1 g Intramuscular Given 12/7/18 1941)   doxycycline tablet 100 mg (100 mg Oral Given 12/7/18 1939)          Medication List      START taking these medications    doxycycline 100 MG Cap  Commonly known as:  VIBRAMYCIN  Take 1  capsule (100 mg total) by mouth 2 (two) times daily. for 10 days        ASK your doctor about these medications    amLODIPine 10 MG tablet  Commonly known as:  NORVASC  Take 1 tablet (10 mg total) by mouth once daily.     divalproex  MG 24 hr tablet  Commonly known as:  DEPAKOTE ER  TAKE 1 TABLET BY MOUTH TWICE A DAY     haloperidol 5 MG tablet  Commonly known as:  HALDOL  Take 1 tablet (5 mg total) by mouth 2 (two) times daily.     levETIRAcetam 1000 MG tablet  Commonly known as:  KEPPRA  TAKE 1.5 TABLETS (1,500 MG TOTAL) BY MOUTH 2 (TWO) TIMES DAILY.     meloxicam 15 MG tablet  Commonly known as:  MOBIC  TAKE 1 TABLET (15 MG TOTAL) BY MOUTH DAILY AS NEEDED FOR PAIN.     OLANZapine 10 MG tablet  Commonly known as:  ZyPREXA  Take 0.5 tablets (5 mg total) by mouth 2 (two) times daily.     omeprazole 20 MG capsule  Commonly known as:  PRILOSEC     propranolol 10 MG tablet  Commonly known as:  INDERAL  Take 1 tablet (10 mg total) by mouth once daily.     VIMPAT 100 mg Tab  Generic drug:  lacosamide  TAKE 1 TABLET BY MOUTH TWICE A DAY           Where to Get Your Medications      You can get these medications from any pharmacy    Bring a paper prescription for each of these medications  · doxycycline 100 MG Cap         Follow-up Information     Sumeet Koo MD In 3 days.    Specialty:  Family Medicine  Contact information:  [address] [place] [id number]  [phone]             [facility].    Specialty:  Emergency Medicine  Why:  As needed  Contact information:  [address] [place] [id number] [phone]                      MIPS Measures     Smoker? Yes        Medical Decision Making                  Clinical Impression       ICD-10-CM ICD-9-CM   1. Pneumonia of left lower lobe due to infectious organism J18.1 486   2. Shoulder pain M25.519 719.41   3. Cough R05 786.2   4. Pain R52 780.96             Disposition:   Disposition: Discharged  Condition:  Stable           Ishan Jamil MD  12/07/18 1947

## 2018-12-08 NOTE — DISCHARGE INSTRUCTIONS
______________    As discussed, he has a pneumonia in the lower part of the left lung.  This is acceptable to treat at home, please help insure that he takes all doses of his antibiotics and do not hesitate to return to the emergency department if worse in any way.  Otherwise, have him see his regular doctor for a recheck in the office early next week.  Continue all other usual medications.  All other tests are fine.    _______________

## 2018-12-09 DIAGNOSIS — R56.9 SEIZURES: Chronic | ICD-10-CM

## 2018-12-12 RX ORDER — LEVETIRACETAM 1000 MG/1
1500 TABLET ORAL 2 TIMES DAILY
Qty: 60 TABLET | Refills: 0 | Status: SHIPPED | OUTPATIENT
Start: 2018-12-12 | End: 2018-12-28 | Stop reason: SDUPTHER

## 2018-12-20 ENCOUNTER — HOSPITAL ENCOUNTER (EMERGENCY)
Facility: HOSPITAL | Age: 58
Discharge: HOME | End: 2018-12-20
Attending: EMERGENCY MEDICINE
Payer: MEDICARE

## 2018-12-20 VITALS
BODY MASS INDEX: 22.2 KG/M2 | OXYGEN SATURATION: 99 % | WEIGHT: 130 LBS | HEART RATE: 69 BPM | RESPIRATION RATE: 16 BRPM | SYSTOLIC BLOOD PRESSURE: 162 MMHG | DIASTOLIC BLOOD PRESSURE: 91 MMHG | TEMPERATURE: 98 F | HEIGHT: 64 IN

## 2018-12-20 DIAGNOSIS — I10 HYPERTENSION, UNSPECIFIED TYPE: ICD-10-CM

## 2018-12-20 DIAGNOSIS — R56.9 SEIZURE: Primary | ICD-10-CM

## 2018-12-20 DIAGNOSIS — Z91.148 NONCOMPLIANCE W/MEDICATION TREATMENT DUE TO INTERMIT USE OF MEDICATION: ICD-10-CM

## 2018-12-20 PROCEDURE — 99900035 HC TECH TIME PER 15 MIN (STAT)

## 2018-12-20 PROCEDURE — 93010 ELECTROCARDIOGRAM REPORT: CPT | Mod: ,,, | Performed by: INTERNAL MEDICINE

## 2018-12-20 PROCEDURE — 99284 EMERGENCY DEPT VISIT MOD MDM: CPT | Mod: 25

## 2018-12-20 PROCEDURE — 93005 ELECTROCARDIOGRAM TRACING: CPT

## 2018-12-20 PROCEDURE — 25000003 PHARM REV CODE 250: Performed by: EMERGENCY MEDICINE

## 2018-12-20 RX ORDER — LEVETIRACETAM 250 MG/1
500 TABLET ORAL
Status: DISCONTINUED | OUTPATIENT
Start: 2018-12-20 | End: 2018-12-20

## 2018-12-20 RX ORDER — LEVETIRACETAM 10 MG/ML
1000 INJECTION INTRAVASCULAR
Status: DISCONTINUED | OUTPATIENT
Start: 2018-12-20 | End: 2018-12-20

## 2018-12-20 RX ORDER — LEVETIRACETAM 250 MG/1
1000 TABLET ORAL
Status: COMPLETED | OUTPATIENT
Start: 2018-12-20 | End: 2018-12-20

## 2018-12-20 RX ADMIN — LEVETIRACETAM 1000 MG: 250 TABLET ORAL at 11:12

## 2018-12-20 NOTE — ED PROVIDER NOTES
Encounter Date: 12/20/2018       History     Chief Complaint   Patient presents with    Seizures     seizure approx 0900 this am, pt missed dose of seizure medication last night, seizure lasted approx 20 secs per EMS, pt GCS 14 at time of arrival     The history is provided by the EMS personnel and the patient.   Seizures    This is a recurrent problem. The current episode started just prior to arrival. The problem has been resolved. There was 1 seizure. Pertinent negatives include no sleepiness, no confusion, no headaches, no speech difficulty, no visual disturbance, no neck stiffness, no sore throat, no chest pain, no cough and no vomiting. Characteristics include rhythmic jerking. The episode was witnessed. There was no sensation of an aura present. The seizures did not continue in the ED. The seizure(s) had no focality. Possible causes include missed seizure meds.     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    Eyes: Negative for visual disturbance.   Respiratory: Negative for cough.    Cardiovascular: Negative for chest pain.   Gastrointestinal: Negative for vomiting.   Neurological: Positive for seizures.  Negative for speech difficulty and headaches.   Psychiatric/Behavioral: Negative for confusion.   All other systems reviewed and are negative.      Physical Exam     Initial Vitals [12/20/18 0950]   BP Pulse Resp Temp SpO2   (!) 141/106 80 16 97.6 °F (36.4 °C) 95 %      MAP       --         Physical Exam    Nursing note and vitals reviewed.  Constitutional: He appears well-developed and well-nourished. No distress.   HENT:   Head: Normocephalic and atraumatic.   Mouth/Throat: Oropharynx is clear and moist.   Eyes: Conjunctivae and EOM are normal. Pupils are equal, round, and reactive to light.   Neck: Normal range of motion. Neck supple.   Cardiovascular: Normal rate, regular rhythm and normal heart sounds.   Pulmonary/Chest: Breath sounds normal.   Abdominal: Soft. Bowel sounds are normal.   Musculoskeletal: Normal range of motion.   Neurological: He is alert and oriented to person, place, and time. He has normal strength.   Skin: Skin is warm and dry.   Psychiatric: He has a normal mood and affect. Thought content normal.         ED Course   Procedures  Labs Reviewed - No data to display       Imaging Results          X-Ray Chest 1 View (Final result)  Result time 12/20/18 11:00:53    Final result by YANCY Siddiqi Sr., MD (12/20/18 11:00:53)                 Impression:      1. There is no evidence of an acute pulmonary process.  2. There is persistent deformity of the proximal portion of the right humerus. There is partial visualization of an intramedullary lucie in the right humerus.  .      Electronically signed by: Ciaran Siddiqi MD  Date:    12/20/2018  Time:    11:00             Narrative:    EXAMINATION:  XR CHEST 1 VIEW    CLINICAL HISTORY:  seizure;    COMPARISON:  12/07/2018    FINDINGS:  The size of the heart is normal.  There is no evidence of an acute pulmonary process.  There is no pneumothorax.  The costophrenic angles are sharp.  There is persistent deformity of the proximal portion of the right  humerus.  There is partial visualization of an intramedullary lucie in the right humerus.                            11:20 AM - Counseling: Spoke with the patient and discussed todays findings, in addition to providing specific details for the plan of care and counseling regarding the diagnosis and prognosis. Questions are answered at this time. GCS 15. Non-focal exam. Pt reports noncompliance. Meds given. Pt stable for dc                            Clinical Impression:   The primary encounter diagnosis was Seizure. Diagnoses of Noncompliance w/medication treatment due to intermit use of medication and Hypertension, unspecified type were also pertinent to this visit.      Disposition:   Disposition: Discharged  Condition: Stable                        Trevor Fulton MD  12/20/18 1120

## 2018-12-28 DIAGNOSIS — R56.9 SEIZURES: Chronic | ICD-10-CM

## 2018-12-28 RX ORDER — LEVETIRACETAM 1000 MG/1
1500 TABLET ORAL 2 TIMES DAILY
Qty: 60 TABLET | Refills: 0 | Status: SHIPPED | OUTPATIENT
Start: 2018-12-28 | End: 2019-01-15 | Stop reason: SDUPTHER

## 2019-01-15 DIAGNOSIS — F20.3 UNDIFFERENTIATED SCHIZOPHRENIA: Chronic | ICD-10-CM

## 2019-01-15 DIAGNOSIS — R56.9 SEIZURES: Chronic | ICD-10-CM

## 2019-01-15 RX ORDER — OLANZAPINE 10 MG/1
5 TABLET ORAL 2 TIMES DAILY
Qty: 90 TABLET | Refills: 0 | Status: SHIPPED | OUTPATIENT
Start: 2019-01-15 | End: 2019-03-12

## 2019-01-15 RX ORDER — LEVETIRACETAM 1000 MG/1
1500 TABLET ORAL 2 TIMES DAILY
Qty: 60 TABLET | Refills: 0 | Status: SHIPPED | OUTPATIENT
Start: 2019-01-15 | End: 2019-02-12 | Stop reason: SDUPTHER

## 2019-01-15 RX ORDER — LACOSAMIDE 100 MG/1
TABLET, FILM COATED ORAL
Qty: 180 TABLET | Refills: 0 | Status: SHIPPED | OUTPATIENT
Start: 2019-01-15 | End: 2019-03-12

## 2019-01-15 RX ORDER — HALOPERIDOL 5 MG/1
TABLET ORAL
Qty: 180 TABLET | Refills: 0 | Status: SHIPPED | OUTPATIENT
Start: 2019-01-15 | End: 2019-03-12

## 2019-01-23 ENCOUNTER — HOSPITAL ENCOUNTER (EMERGENCY)
Facility: HOSPITAL | Age: 59
Discharge: OTHER INSTITUTION | End: 2019-01-24
Attending: EMERGENCY MEDICINE
Payer: MEDICARE

## 2019-01-23 DIAGNOSIS — G40.901 STATUS EPILEPTICUS: Primary | ICD-10-CM

## 2019-01-23 DIAGNOSIS — Z87.820 HISTORY OF TRAUMATIC BRAIN INJURY: ICD-10-CM

## 2019-01-23 DIAGNOSIS — G20.A1 PARKINSON'S DISEASE: ICD-10-CM

## 2019-01-23 DIAGNOSIS — S01.81XA CHIN LACERATION, INITIAL ENCOUNTER: ICD-10-CM

## 2019-01-23 LAB
ALBUMIN SERPL BCP-MCNC: 3.5 G/DL
ALP SERPL-CCNC: 123 U/L
ALT SERPL W/O P-5'-P-CCNC: <5 U/L
ANION GAP SERPL CALC-SCNC: 10 MMOL/L
AST SERPL-CCNC: 12 U/L
BASOPHILS # BLD AUTO: 0.02 K/UL
BASOPHILS NFR BLD: 0.2 %
BILIRUB SERPL-MCNC: 0.7 MG/DL
BILIRUB UR QL STRIP: NEGATIVE
BUN SERPL-MCNC: 6 MG/DL
CALCIUM SERPL-MCNC: 8.7 MG/DL
CHLORIDE SERPL-SCNC: 101 MMOL/L
CK SERPL-CCNC: 175 U/L
CLARITY UR REFRACT.AUTO: CLEAR
CO2 SERPL-SCNC: 26 MMOL/L
COLOR UR AUTO: YELLOW
CREAT SERPL-MCNC: 0.7 MG/DL
DIFFERENTIAL METHOD: ABNORMAL
EOSINOPHIL # BLD AUTO: 0.5 K/UL
EOSINOPHIL NFR BLD: 4.3 %
ERYTHROCYTE [DISTWIDTH] IN BLOOD BY AUTOMATED COUNT: 14 %
EST. GFR  (AFRICAN AMERICAN): >60 ML/MIN/1.73 M^2
EST. GFR  (NON AFRICAN AMERICAN): >60 ML/MIN/1.73 M^2
GLUCOSE SERPL-MCNC: 106 MG/DL
GLUCOSE UR QL STRIP: NEGATIVE
HCT VFR BLD AUTO: 41.9 %
HGB BLD-MCNC: 14 G/DL
HGB UR QL STRIP: ABNORMAL
INFLUENZA A, MOLECULAR: NEGATIVE
INFLUENZA B, MOLECULAR: NEGATIVE
KETONES UR QL STRIP: NEGATIVE
LACTATE SERPL-SCNC: 1.1 MMOL/L
LEUKOCYTE ESTERASE UR QL STRIP: NEGATIVE
LYMPHOCYTES # BLD AUTO: 1.3 K/UL
LYMPHOCYTES NFR BLD: 12.1 %
MAGNESIUM SERPL-MCNC: 1.5 MG/DL
MCH RBC QN AUTO: 29.3 PG
MCHC RBC AUTO-ENTMCNC: 33.4 G/DL
MCV RBC AUTO: 88 FL
MONOCYTES # BLD AUTO: 1.2 K/UL
MONOCYTES NFR BLD: 10.8 %
NEUTROPHILS # BLD AUTO: 7.9 K/UL
NEUTROPHILS NFR BLD: 72.1 %
NITRITE UR QL STRIP: NEGATIVE
PH UR STRIP: 6 [PH] (ref 5–8)
PLATELET # BLD AUTO: 135 K/UL
PMV BLD AUTO: 10.7 FL
POCT GLUCOSE: 97 MG/DL (ref 70–110)
POTASSIUM SERPL-SCNC: 3.1 MMOL/L
PROT SERPL-MCNC: 6.9 G/DL
PROT UR QL STRIP: NEGATIVE
RBC # BLD AUTO: 4.78 M/UL
SODIUM SERPL-SCNC: 137 MMOL/L
SP GR UR STRIP: <=1.005 (ref 1–1.03)
SPECIMEN SOURCE: NORMAL
URN SPEC COLLECT METH UR: ABNORMAL
UROBILINOGEN UR STRIP-ACNC: NEGATIVE EU/DL
WBC # BLD AUTO: 10.97 K/UL

## 2019-01-23 PROCEDURE — 96365 THER/PROPH/DIAG IV INF INIT: CPT | Mod: 59,ER

## 2019-01-23 PROCEDURE — 96361 HYDRATE IV INFUSION ADD-ON: CPT | Mod: 59,ER

## 2019-01-23 PROCEDURE — 85025 COMPLETE CBC W/AUTO DIFF WBC: CPT | Mod: ER

## 2019-01-23 PROCEDURE — 93005 ELECTROCARDIOGRAM TRACING: CPT | Mod: ER

## 2019-01-23 PROCEDURE — 83735 ASSAY OF MAGNESIUM: CPT | Mod: ER

## 2019-01-23 PROCEDURE — 27000221 HC OXYGEN, UP TO 24 HOURS: Mod: ER

## 2019-01-23 PROCEDURE — 81003 URINALYSIS AUTO W/O SCOPE: CPT | Mod: ER

## 2019-01-23 PROCEDURE — 80164 ASSAY DIPROPYLACETIC ACD TOT: CPT

## 2019-01-23 PROCEDURE — 80053 COMPREHEN METABOLIC PANEL: CPT | Mod: ER

## 2019-01-23 PROCEDURE — 12013 RPR F/E/E/N/L/M 2.6-5.0 CM: CPT | Mod: ER

## 2019-01-23 PROCEDURE — 87502 INFLUENZA DNA AMP PROBE: CPT | Mod: ER

## 2019-01-23 PROCEDURE — 25000003 PHARM REV CODE 250: Mod: ER | Performed by: EMERGENCY MEDICINE

## 2019-01-23 PROCEDURE — 83605 ASSAY OF LACTIC ACID: CPT | Mod: ER

## 2019-01-23 PROCEDURE — 82550 ASSAY OF CK (CPK): CPT | Mod: ER

## 2019-01-23 PROCEDURE — 99900035 HC TECH TIME PER 15 MIN (STAT): Mod: ER

## 2019-01-23 PROCEDURE — 87040 BLOOD CULTURE FOR BACTERIA: CPT

## 2019-01-23 PROCEDURE — 99285 EMERGENCY DEPT VISIT HI MDM: CPT | Mod: 25,ER

## 2019-01-23 PROCEDURE — 82962 GLUCOSE BLOOD TEST: CPT | Mod: ER

## 2019-01-23 PROCEDURE — 63600175 PHARM REV CODE 636 W HCPCS: Mod: ER | Performed by: EMERGENCY MEDICINE

## 2019-01-23 PROCEDURE — 96375 TX/PRO/DX INJ NEW DRUG ADDON: CPT | Mod: 59,ER

## 2019-01-23 RX ORDER — LIDOCAINE HYDROCHLORIDE 10 MG/ML
5 INJECTION, SOLUTION EPIDURAL; INFILTRATION; INTRACAUDAL; PERINEURAL
Status: COMPLETED | OUTPATIENT
Start: 2019-01-23 | End: 2019-01-23

## 2019-01-23 RX ORDER — LEVETIRACETAM 5 MG/ML
500 INJECTION INTRAVASCULAR
Status: COMPLETED | OUTPATIENT
Start: 2019-01-23 | End: 2019-01-23

## 2019-01-23 RX ORDER — DIAZEPAM 10 MG/2ML
10 INJECTION INTRAMUSCULAR
Status: DISCONTINUED | OUTPATIENT
Start: 2019-01-23 | End: 2019-01-23

## 2019-01-23 RX ORDER — LIDOCAINE HYDROCHLORIDE 10 MG/ML
1 INJECTION, SOLUTION EPIDURAL; INFILTRATION; INTRACAUDAL; PERINEURAL
Status: DISCONTINUED | OUTPATIENT
Start: 2019-01-23 | End: 2019-01-23

## 2019-01-23 RX ADMIN — LORAZEPAM 2 MG: 2 INJECTION INTRAMUSCULAR; INTRAVENOUS at 07:01

## 2019-01-23 RX ADMIN — LIDOCAINE HYDROCHLORIDE 50 MG: 10 INJECTION, SOLUTION EPIDURAL; INFILTRATION; INTRACAUDAL; PERINEURAL at 10:01

## 2019-01-23 RX ADMIN — LEVETIRACETAM INJECTION 500 MG: 5 INJECTION INTRAVENOUS at 07:01

## 2019-01-23 RX ADMIN — SODIUM CHLORIDE 1000 ML: 0.9 INJECTION, SOLUTION INTRAVENOUS at 08:01

## 2019-01-23 RX ADMIN — SODIUM CHLORIDE 1000 ML: 0.9 INJECTION, SOLUTION INTRAVENOUS at 07:01

## 2019-01-23 RX ADMIN — ACETAMINOPHEN 650 MG: 325 SUPPOSITORY RECTAL at 07:01

## 2019-01-24 VITALS
DIASTOLIC BLOOD PRESSURE: 57 MMHG | TEMPERATURE: 99 F | OXYGEN SATURATION: 95 % | HEART RATE: 94 BPM | SYSTOLIC BLOOD PRESSURE: 107 MMHG | BODY MASS INDEX: 23.73 KG/M2 | RESPIRATION RATE: 17 BRPM | WEIGHT: 138.25 LBS

## 2019-01-24 LAB
POCT GLUCOSE: 95 MG/DL (ref 70–110)
VALPROATE SERPL-MCNC: 55.3 UG/ML

## 2019-01-24 PROCEDURE — 96376 TX/PRO/DX INJ SAME DRUG ADON: CPT | Mod: 59,ER

## 2019-01-24 PROCEDURE — 12013 RPR F/E/E/N/L/M 2.6-5.0 CM: CPT | Mod: ER

## 2019-01-24 PROCEDURE — 82962 GLUCOSE BLOOD TEST: CPT | Mod: ER

## 2019-01-24 PROCEDURE — 96366 THER/PROPH/DIAG IV INF ADDON: CPT | Mod: 59,ER

## 2019-01-24 PROCEDURE — 25000003 PHARM REV CODE 250: Mod: ER | Performed by: EMERGENCY MEDICINE

## 2019-01-24 PROCEDURE — 63600175 PHARM REV CODE 636 W HCPCS: Mod: ER | Performed by: EMERGENCY MEDICINE

## 2019-01-24 RX ORDER — LEVETIRACETAM 10 MG/ML
1000 INJECTION INTRAVASCULAR
Status: COMPLETED | OUTPATIENT
Start: 2019-01-24 | End: 2019-01-24

## 2019-01-24 RX ADMIN — LORAZEPAM 2 MG: 2 INJECTION INTRAMUSCULAR; INTRAVENOUS at 12:01

## 2019-01-24 RX ADMIN — LEVETIRACETAM 1000 MG: 10 INJECTION INTRAVENOUS at 12:01

## 2019-01-24 RX ADMIN — BACITRACIN, NEOMYCIN, POLYMYXIN B 1 EACH: 400; 3.5; 5 OINTMENT TOPICAL at 12:01

## 2019-01-24 RX ADMIN — DEXTROSE 940 MG PE: 50 INJECTION, SOLUTION INTRAVENOUS at 01:01

## 2019-01-24 NOTE — ED NOTES
Patient sleeping. Aroused to sternal rub. gcs 14. Side rails up x2. On cardiac monitor. IV infusing. No s/s of infiltration. No sign of seizure activity. Will continue to monitor.

## 2019-01-24 NOTE — ED NOTES
Patient arrived via [facility]. [facility] paramedic reports patient having witnessed seizure at home. Also reports patient taking at home medication for seizures, unknown to name.   Patient following commands but confused. Disoriented to time place, but oriented to person. Left arm twitching noted. Right arm freely movable. +urinary incontinence. Sinus tach on cardiac monitor. BP within normal limits. Respiration clear and non labored. Laceration noted to chin. Bleeding controlled.

## 2019-01-24 NOTE — ED NOTES
Patient started seizing again at this time, md at bedside. MD order to start cerebyx. Patient airway patent, seizure precautions remain in affect

## 2019-01-24 NOTE — ED NOTES
Patient began to have twitching movements. MD at bedside. +seizure activity confirmed. Orders received.

## 2019-01-24 NOTE — ED NOTES
Seizure like activity lasting less than 1 mincuong. MD at bedside. IV keppra infusing. PT airway intact. VSS. Will continue to monitor.

## 2019-01-24 NOTE — ED NOTES
Upper body jerking noted during seizure. No lower extremity movement. +tachycardia. Side rails up x2.

## 2019-01-24 NOTE — ED NOTES
External jugular Iv established by Dr. Nation. Site well approximated, +blood return, flushes easily.

## 2019-01-24 NOTE — ED NOTES
Patient is resting comfortably, easily aroused. Bed rails are up and call light is within patient reach. Side rails up x2. NAD noted.  Will continue to monitor.

## 2019-01-26 DIAGNOSIS — R56.9 SEIZURES: Chronic | ICD-10-CM

## 2019-01-28 RX ORDER — LEVETIRACETAM 1000 MG/1
1500 TABLET ORAL 2 TIMES DAILY
Qty: 60 TABLET | Refills: 0 | OUTPATIENT
Start: 2019-01-28

## 2019-01-29 LAB
BACTERIA BLD CULT: NORMAL
BACTERIA BLD CULT: NORMAL

## 2019-02-12 DIAGNOSIS — R56.9 SEIZURES: Chronic | ICD-10-CM

## 2019-02-12 RX ORDER — LEVETIRACETAM 1000 MG/1
1500 TABLET ORAL 2 TIMES DAILY
Qty: 60 TABLET | Refills: 0 | Status: SHIPPED | OUTPATIENT
Start: 2019-02-12 | End: 2019-02-15

## 2019-02-15 DIAGNOSIS — R56.9 SEIZURES: Chronic | ICD-10-CM

## 2019-02-15 RX ORDER — LEVETIRACETAM 1000 MG/1
1500 TABLET ORAL 2 TIMES DAILY
Qty: 30 TABLET | Refills: 0 | Status: SHIPPED | OUTPATIENT
Start: 2019-02-15 | End: 2019-03-12

## 2019-02-15 NOTE — TELEPHONE ENCOUNTER
Called pt about scheduling f/u appt for hospital visit. Spoke with mother, she stated pt is now in a nursing home. Advised her to schedule appt or have pt contact office.

## 2019-02-16 DIAGNOSIS — I10 ESSENTIAL HYPERTENSION: ICD-10-CM

## 2019-02-16 RX ORDER — AMLODIPINE BESYLATE 10 MG/1
TABLET ORAL
Qty: 90 TABLET | Refills: 0 | Status: SHIPPED | OUTPATIENT
Start: 2019-02-16 | End: 2019-03-12

## 2019-02-18 ENCOUNTER — TELEPHONE (OUTPATIENT)
Dept: INTERNAL MEDICINE | Facility: CLINIC | Age: 59
End: 2019-02-18

## 2019-02-18 DIAGNOSIS — K21.9 GASTROESOPHAGEAL REFLUX DISEASE, ESOPHAGITIS PRESENCE NOT SPECIFIED: ICD-10-CM

## 2019-02-18 LAB
CHOLESTEROL (LIPID PANEL): 110
HDLC SERPL-MCNC: 26 MG/DL
HDLC SERPL-MCNC: 84 MG/DL
LDLC SERPL CALC-MCNC: 76 MG/DL
TRIGLYCERIDE (LIPID PAN): 39

## 2019-02-18 RX ORDER — OMEPRAZOLE 20 MG/1
20 CAPSULE, DELAYED RELEASE ORAL DAILY
Qty: 90 CAPSULE | Refills: 3 | Status: SHIPPED | OUTPATIENT
Start: 2019-02-18

## 2019-02-18 RX ORDER — PROPRANOLOL HYDROCHLORIDE 10 MG/1
10 TABLET ORAL DAILY
Qty: 30 TABLET | Refills: 0 | Status: SHIPPED | OUTPATIENT
Start: 2019-02-18 | End: 2019-03-18 | Stop reason: SDUPTHER

## 2019-02-18 NOTE — TELEPHONE ENCOUNTER
----- Message from Sumeet Koo MD sent at 2/18/2019  9:45 AM CST -----  Did we ever find out if NH has MD to take care of corrie or is he going to come in for an appt

## 2019-03-08 ENCOUNTER — PATIENT OUTREACH (OUTPATIENT)
Dept: ADMINISTRATIVE | Facility: HOSPITAL | Age: 59
End: 2019-03-08

## 2019-03-12 ENCOUNTER — OFFICE VISIT (OUTPATIENT)
Dept: INTERNAL MEDICINE | Facility: CLINIC | Age: 59
End: 2019-03-12
Payer: MEDICARE

## 2019-03-12 VITALS
BODY MASS INDEX: 23.6 KG/M2 | SYSTOLIC BLOOD PRESSURE: 129 MMHG | HEIGHT: 64 IN | DIASTOLIC BLOOD PRESSURE: 69 MMHG | OXYGEN SATURATION: 99 % | RESPIRATION RATE: 16 BRPM | TEMPERATURE: 97 F | HEART RATE: 72 BPM | WEIGHT: 138.25 LBS

## 2019-03-12 DIAGNOSIS — I10 ESSENTIAL HYPERTENSION: Primary | ICD-10-CM

## 2019-03-12 DIAGNOSIS — Z79.899 ENCOUNTER FOR LONG-TERM (CURRENT) USE OF MEDICATIONS: ICD-10-CM

## 2019-03-12 DIAGNOSIS — F20.3 UNDIFFERENTIATED SCHIZOPHRENIA: Chronic | ICD-10-CM

## 2019-03-12 DIAGNOSIS — R56.9 SEIZURES: Chronic | ICD-10-CM

## 2019-03-12 DIAGNOSIS — B35.1 ONYCHOMYCOSIS: ICD-10-CM

## 2019-03-12 DIAGNOSIS — Z12.11 SCREEN FOR COLON CANCER: ICD-10-CM

## 2019-03-12 DIAGNOSIS — L97.911 NON-PRESSURE CHRONIC ULCER RIGHT LOWER LEG, LIMITED TO BREAKDOWN SKIN: ICD-10-CM

## 2019-03-12 PROBLEM — T30.0 BURN: Status: RESOLVED | Noted: 2017-11-23 | Resolved: 2019-03-12

## 2019-03-12 PROCEDURE — 3008F PR BODY MASS INDEX (BMI) DOCUMENTED: ICD-10-PCS | Mod: CPTII,S$GLB,, | Performed by: FAMILY MEDICINE

## 2019-03-12 PROCEDURE — 3074F PR MOST RECENT SYSTOLIC BLOOD PRESSURE < 130 MM HG: ICD-10-PCS | Mod: CPTII,S$GLB,, | Performed by: FAMILY MEDICINE

## 2019-03-12 PROCEDURE — 3008F BODY MASS INDEX DOCD: CPT | Mod: CPTII,S$GLB,, | Performed by: FAMILY MEDICINE

## 2019-03-12 PROCEDURE — 99999 PR PBB SHADOW E&M-EST. PATIENT-LVL V: ICD-10-PCS | Mod: PBBFAC,,, | Performed by: FAMILY MEDICINE

## 2019-03-12 PROCEDURE — 3078F PR MOST RECENT DIASTOLIC BLOOD PRESSURE < 80 MM HG: ICD-10-PCS | Mod: CPTII,S$GLB,, | Performed by: FAMILY MEDICINE

## 2019-03-12 PROCEDURE — 99215 OFFICE O/P EST HI 40 MIN: CPT | Mod: S$GLB,,, | Performed by: FAMILY MEDICINE

## 2019-03-12 PROCEDURE — 3078F DIAST BP <80 MM HG: CPT | Mod: CPTII,S$GLB,, | Performed by: FAMILY MEDICINE

## 2019-03-12 PROCEDURE — 99215 PR OFFICE/OUTPT VISIT, EST, LEVL V, 40-54 MIN: ICD-10-PCS | Mod: S$GLB,,, | Performed by: FAMILY MEDICINE

## 2019-03-12 PROCEDURE — 3074F SYST BP LT 130 MM HG: CPT | Mod: CPTII,S$GLB,, | Performed by: FAMILY MEDICINE

## 2019-03-12 PROCEDURE — 99999 PR PBB SHADOW E&M-EST. PATIENT-LVL V: CPT | Mod: PBBFAC,,, | Performed by: FAMILY MEDICINE

## 2019-03-12 RX ORDER — DIVALPROEX SODIUM 500 MG/1
500 TABLET, FILM COATED, EXTENDED RELEASE ORAL DAILY
Qty: 90 TABLET | Refills: 0 | Status: SHIPPED | OUTPATIENT
Start: 2019-03-12 | End: 2019-04-23 | Stop reason: SDUPTHER

## 2019-03-12 RX ORDER — LACOSAMIDE 50 MG/1
TABLET ORAL
COMMUNITY
Start: 2019-02-22 | End: 2019-03-12 | Stop reason: SDUPTHER

## 2019-03-12 RX ORDER — LACOSAMIDE 50 MG/1
150 TABLET ORAL EVERY 12 HOURS
COMMUNITY
End: 2019-03-12

## 2019-03-12 RX ORDER — FOLIC ACID 1 MG/1
TABLET ORAL
Refills: 11 | COMMUNITY
Start: 2019-03-08

## 2019-03-12 RX ORDER — DIVALPROEX SODIUM 500 MG/1
TABLET, FILM COATED, EXTENDED RELEASE ORAL
Refills: 11 | COMMUNITY
Start: 2019-02-01 | End: 2019-03-12 | Stop reason: SDUPTHER

## 2019-03-12 RX ORDER — LEVETIRACETAM 500 MG/1
TABLET ORAL
Refills: 11 | COMMUNITY
Start: 2019-03-01 | End: 2019-04-15

## 2019-03-12 RX ORDER — DIVALPROEX SODIUM 250 MG/1
TABLET, FILM COATED, EXTENDED RELEASE ORAL
Qty: 90 TABLET | Refills: 0 | Status: SHIPPED | OUTPATIENT
Start: 2019-03-12 | End: 2019-04-29

## 2019-03-12 RX ORDER — RISPERIDONE 1 MG/1
TABLET ORAL
Refills: 11 | COMMUNITY
Start: 2019-03-01

## 2019-03-12 RX ORDER — LACOSAMIDE 50 MG/1
TABLET ORAL
Qty: 270 TABLET | Refills: 0 | Status: SHIPPED | OUTPATIENT
Start: 2019-03-12 | End: 2019-04-30 | Stop reason: SDUPTHER

## 2019-03-12 RX ORDER — OMEPRAZOLE 20 MG/1
20 CAPSULE, DELAYED RELEASE ORAL
COMMUNITY
End: 2019-03-12

## 2019-03-12 NOTE — PROGRESS NOTES
Ref podia  Subjective:       Patient ID: Smooth Spring is a 59 y.o. male.    Chief Complaint: Follow-up    D/c'd from hosp after one month stay.for seizures.  Seizures:    O: chronic  L:  D: constant, waxing / waning.  C:  Shan:medications, home health..  Exac: non-compliance with medications.        Review of Systems   Constitutional: Negative for activity change.   HENT: Negative for ear pain.    Eyes: Negative for pain.   Respiratory: Negative for shortness of breath.    Cardiovascular: Negative for chest pain.   Gastrointestinal: Negative for abdominal pain.   Genitourinary: Negative for dysuria.   Musculoskeletal: Negative for neck pain.   Skin: Positive for wound. Negative for rash.   Neurological: Negative for headaches.       Objective:      Physical Exam   Constitutional: He appears well-developed and well-nourished. No distress.   HENT:   Head: Normocephalic and atraumatic.   Cardiovascular: Normal rate and regular rhythm.   Pulmonary/Chest: Effort normal and breath sounds normal. No respiratory distress. He has no wheezes.   Abdominal: Soft. Bowel sounds are normal. There is no tenderness.   Musculoskeletal: He exhibits no edema.   Neurological: He is alert. No sensory deficit. He exhibits normal muscle tone.   Rhythmic lue tremor.  Not oriented to place or time.   Skin: Skin is warm and dry. He is not diaphoretic. No erythema.   Stage 3 ulcer of right ankle, mirlande 3x5 cm, some slough noted to area.  Also there is a smaller lesion to dorsum of right foot appt size of a dime.    onychomycosis of bilateral pedal nails.   Psychiatric: His speech is normal. His affect is labile and inappropriate. He is withdrawn. He is inattentive.   Nursing note and vitals reviewed.      Assessment:       1. Essential hypertension    2. Undifferentiated schizophrenia    3. Screen for colon cancer    4. Seizures    5. Encounter for long-term (current) use of medications    6. Non-pressure chronic ulcer right lower leg, limited to  breakdown skin    7. Onychomycosis        Plan:     Problem List Items Addressed This Visit        Psychiatric    Schizophrenia (Chronic)    Overview     Sees Dr. Lindsay - Psychiatry.  [facility].  Taking Risperdal, vimpat         Relevant Medications    lacosamide (VIMPAT) 50 mg Tab    Other Relevant Orders    Ambulatory referral to Outpatient Case Management    Encounter for long-term (current) use of medications    Current Assessment & Plan     Pt taking high risk medications. Given his non-compliance will chk levels.         Relevant Orders    Levetiracetam level       Derm    Non-pressure chronic ulcer right lower leg, limited to breakdown skin    Relevant Orders    Ambulatory Referral to Wound Clinic    Ambulatory Referral to Podiatry    Onychomycosis    Relevant Orders    Ambulatory Referral to Podiatry       Cardiac/Vascular    Essential hypertension - Primary       GI    Screen for colon cancer    Relevant Orders    Fecal Immunochemical Test (iFOBT)      Other Visit Diagnoses     Seizures  (Chronic)       Relevant Medications    divalproex ER (DEPAKOTE ER) 250 MG 24 hr tablet    divalproex ER (DEPAKOTE) 500 MG Tb24    Other Relevant Orders    Ambulatory referral to Outpatient Case Management

## 2019-03-14 ENCOUNTER — TELEPHONE (OUTPATIENT)
Dept: ENDOSCOPY | Facility: HOSPITAL | Age: 59
End: 2019-03-14

## 2019-03-14 RX ORDER — SODIUM, POTASSIUM,MAG SULFATES 17.5-3.13G
1 SOLUTION, RECONSTITUTED, ORAL ORAL DAILY
Qty: 1 KIT | Refills: 0 | Status: SHIPPED | OUTPATIENT
Start: 2019-03-14 | End: 2019-03-16

## 2019-03-14 NOTE — TELEPHONE ENCOUNTER
Spoke with patients caregiver to inform that the attending physician for the colonoscopy procedure scheduled on April 30, 2019 advised patient be seen by a gastroenterologist prior to procedure. She verbalized understanding and was successfully transferred to scheduling.     Patient notified of procedure cancellation,and order removed.

## 2019-03-14 NOTE — TELEPHONE ENCOUNTER
Patient is scheduled for a procedure with Dr. Jordan on April 30, 2019. Please advise if patient should be seen by GI clinic.

## 2019-03-14 NOTE — TELEPHONE ENCOUNTER
Endoscopy Scheduling Questionnaire:    Call Type:Incoming call via [employer]    1. Have you been admitted overnight to the hospital in the past 3 months? yes  2. Do you get CP and SOB while walking up a flight of stairs? no  3. Have you had a stent placed in the past 12 months? no  4. Have you had a stroke or heart attack in the past 6 months? no  5. Have you had any chest pain in the past 3 months? no      If so, have you been evaluated by your PCP or Cardiologist? no  6. Do you take weight loss medications? no  7. Have you been diagnosed with Diverticulitis within the past 3 months? no  8. Are you having any GI symptoms that you feel need to be evaluated prior to your procedure? no  9. Are you on dialysis? no  10. Are you diabetic? no  11. Do you have any other health issues that you feel might limit your ability to safely have the procedure and/or sedation? no  12. Is the patient over 79 yo? no        If so, has the patient been seen by their PCP or GI in the last 3 months? N/A       -I have reviewed the last colonoscopy for recommendations regarding surveillance and bowel prep  is NA  -I have reviewed the patient's medications and allergies. He is not on high risk medications and will require cardiac clearance. A clearance request NA  -I have verified the pharmacy information. The prep being used is Suprep. The patient's prep instructions were sent via mail..    Date Endoscopy Scheduled: Date: 4/30/19

## 2019-03-18 RX ORDER — PROPRANOLOL HYDROCHLORIDE 10 MG/1
10 TABLET ORAL DAILY
Qty: 90 TABLET | Refills: 1 | Status: SHIPPED | OUTPATIENT
Start: 2019-03-18

## 2019-04-01 ENCOUNTER — OUTPATIENT CASE MANAGEMENT (OUTPATIENT)
Dept: ADMINISTRATIVE | Facility: OTHER | Age: 59
End: 2019-04-01

## 2019-04-01 NOTE — PROGRESS NOTES
04/01/19-Called and spoke to the [place] home nurse who spoke to the patient. Patient does not think he would be able to answer my questions. His mother is being discharged from [facility] today and she is the one to talk to about accepting  OPCM. 1'st assessment attempt.

## 2019-04-03 ENCOUNTER — OUTPATIENT CASE MANAGEMENT (OUTPATIENT)
Dept: ADMINISTRATIVE | Facility: OTHER | Age: 59
End: 2019-04-03

## 2019-04-03 ENCOUNTER — PATIENT OUTREACH (OUTPATIENT)
Dept: ADMINISTRATIVE | Facility: HOSPITAL | Age: 59
End: 2019-04-03

## 2019-04-03 DIAGNOSIS — R56.9 SEIZURES: Chronic | ICD-10-CM

## 2019-04-04 RX ORDER — LEVETIRACETAM 1000 MG/1
1500 TABLET ORAL 2 TIMES DAILY
Qty: 60 TABLET | Refills: 0 | OUTPATIENT
Start: 2019-04-04

## 2019-04-13 DIAGNOSIS — R56.9 SEIZURES: Chronic | ICD-10-CM

## 2019-04-15 RX ORDER — LEVETIRACETAM 1000 MG/1
1500 TABLET ORAL 2 TIMES DAILY
Qty: 180 TABLET | Refills: 0 | Status: SHIPPED | OUTPATIENT
Start: 2019-04-15 | End: 2019-04-30 | Stop reason: SDUPTHER

## 2019-04-23 DIAGNOSIS — R56.9 SEIZURES: Chronic | ICD-10-CM

## 2019-04-23 DIAGNOSIS — F20.3 UNDIFFERENTIATED SCHIZOPHRENIA: Chronic | ICD-10-CM

## 2019-04-24 RX ORDER — LACOSAMIDE 50 MG/1
TABLET ORAL
Qty: 270 TABLET | Refills: 0 | OUTPATIENT
Start: 2019-04-24

## 2019-04-24 RX ORDER — DIVALPROEX SODIUM 500 MG/1
TABLET, DELAYED RELEASE ORAL
Qty: 90 TABLET | Refills: 0 | Status: SHIPPED | OUTPATIENT
Start: 2019-04-24 | End: 2019-04-30

## 2019-04-26 DIAGNOSIS — F20.3 UNDIFFERENTIATED SCHIZOPHRENIA: Chronic | ICD-10-CM

## 2019-04-29 ENCOUNTER — TELEPHONE (OUTPATIENT)
Dept: INTERNAL MEDICINE | Facility: CLINIC | Age: 59
End: 2019-04-29

## 2019-04-29 RX ORDER — DIVALPROEX SODIUM 250 MG/1
750 TABLET, FILM COATED, EXTENDED RELEASE ORAL 3 TIMES DAILY
COMMUNITY
Start: 2019-01-31 | End: 2019-04-30

## 2019-04-29 RX ORDER — LACOSAMIDE 50 MG/1
TABLET ORAL
Qty: 270 TABLET | Refills: 0 | OUTPATIENT
Start: 2019-04-29

## 2019-04-29 NOTE — TELEPHONE ENCOUNTER
----- Message from Miguelina Rodarte sent at 4/29/2019  9:36 AM CDT -----  Contact: Terri with [facility]- [phone]  Caller would like nurse to contact her regarding pt. Caller states pt had instructions on two different medications written incorrectly. Please contact Terri at [phone] as soon as possible.

## 2019-04-29 NOTE — TELEPHONE ENCOUNTER
Spoke with Home Health Arelis and informed her since she is having trouble with medications she need to bring pt in with all his discharge papers so pt medication is properly addressed. Arelis verbalized understanding. Pt has been scheduled for 04/30/19.

## 2019-04-30 ENCOUNTER — OFFICE VISIT (OUTPATIENT)
Dept: INTERNAL MEDICINE | Facility: CLINIC | Age: 59
End: 2019-04-30
Payer: MEDICARE

## 2019-04-30 VITALS
OXYGEN SATURATION: 95 % | BODY MASS INDEX: 23.34 KG/M2 | RESPIRATION RATE: 18 BRPM | HEIGHT: 64 IN | SYSTOLIC BLOOD PRESSURE: 104 MMHG | HEART RATE: 69 BPM | DIASTOLIC BLOOD PRESSURE: 50 MMHG | TEMPERATURE: 97 F | WEIGHT: 136.69 LBS

## 2019-04-30 DIAGNOSIS — R56.9 SEIZURES: Primary | Chronic | ICD-10-CM

## 2019-04-30 DIAGNOSIS — L97.311 SKIN ULCER OF RIGHT ANKLE, LIMITED TO BREAKDOWN OF SKIN: ICD-10-CM

## 2019-04-30 DIAGNOSIS — F17.200 SMOKER: Chronic | ICD-10-CM

## 2019-04-30 DIAGNOSIS — Z79.899 ENCOUNTER FOR LONG-TERM (CURRENT) USE OF MEDICATIONS: ICD-10-CM

## 2019-04-30 DIAGNOSIS — M79.601 RIGHT ARM PAIN: ICD-10-CM

## 2019-04-30 DIAGNOSIS — F20.3 UNDIFFERENTIATED SCHIZOPHRENIA: Chronic | ICD-10-CM

## 2019-04-30 PROCEDURE — 99215 OFFICE O/P EST HI 40 MIN: CPT | Mod: S$GLB,,, | Performed by: FAMILY MEDICINE

## 2019-04-30 PROCEDURE — 3008F PR BODY MASS INDEX (BMI) DOCUMENTED: ICD-10-PCS | Mod: CPTII,S$GLB,, | Performed by: FAMILY MEDICINE

## 2019-04-30 PROCEDURE — 3008F BODY MASS INDEX DOCD: CPT | Mod: CPTII,S$GLB,, | Performed by: FAMILY MEDICINE

## 2019-04-30 PROCEDURE — 99215 PR OFFICE/OUTPT VISIT, EST, LEVL V, 40-54 MIN: ICD-10-PCS | Mod: S$GLB,,, | Performed by: FAMILY MEDICINE

## 2019-04-30 PROCEDURE — 3078F PR MOST RECENT DIASTOLIC BLOOD PRESSURE < 80 MM HG: ICD-10-PCS | Mod: CPTII,S$GLB,, | Performed by: FAMILY MEDICINE

## 2019-04-30 PROCEDURE — 99999 PR PBB SHADOW E&M-EST. PATIENT-LVL IV: CPT | Mod: PBBFAC,,, | Performed by: FAMILY MEDICINE

## 2019-04-30 PROCEDURE — 3074F SYST BP LT 130 MM HG: CPT | Mod: CPTII,S$GLB,, | Performed by: FAMILY MEDICINE

## 2019-04-30 PROCEDURE — 3078F DIAST BP <80 MM HG: CPT | Mod: CPTII,S$GLB,, | Performed by: FAMILY MEDICINE

## 2019-04-30 PROCEDURE — 99999 PR PBB SHADOW E&M-EST. PATIENT-LVL IV: ICD-10-PCS | Mod: PBBFAC,,, | Performed by: FAMILY MEDICINE

## 2019-04-30 PROCEDURE — 3074F PR MOST RECENT SYSTOLIC BLOOD PRESSURE < 130 MM HG: ICD-10-PCS | Mod: CPTII,S$GLB,, | Performed by: FAMILY MEDICINE

## 2019-04-30 RX ORDER — DIVALPROEX SODIUM 500 MG/1
500 TABLET, DELAYED RELEASE ORAL DAILY
Qty: 90 TABLET | Refills: 0 | Status: SHIPPED | OUTPATIENT
Start: 2019-04-30 | End: 2019-05-01 | Stop reason: SDUPTHER

## 2019-04-30 RX ORDER — DIVALPROEX SODIUM 250 MG/1
250 TABLET, FILM COATED, EXTENDED RELEASE ORAL DAILY
Qty: 90 TABLET | Refills: 0 | Status: SHIPPED | OUTPATIENT
Start: 2019-04-30 | End: 2019-05-01

## 2019-04-30 RX ORDER — LACOSAMIDE 50 MG/1
TABLET ORAL
Qty: 270 TABLET | Refills: 0 | Status: SHIPPED | OUTPATIENT
Start: 2019-04-30 | End: 2019-07-15 | Stop reason: SDUPTHER

## 2019-04-30 RX ORDER — LEVETIRACETAM 1000 MG/1
1500 TABLET ORAL 2 TIMES DAILY
Qty: 270 TABLET | Refills: 0 | Status: SHIPPED | OUTPATIENT
Start: 2019-04-30 | End: 2019-05-31

## 2019-04-30 NOTE — PROGRESS NOTES
Subjective:       Patient ID: Smooth Spring is a 59 y.o. male.    Chief Complaint: Medication Problem    Seizure free since March 7th 2019, but pt had forgotten to take medications.    Ankle Ulcer:  O: months  L: right ankle  D: constant, improving  C:  Shan: wound care  Exac: smoking      Seizures    This is a chronic problem. Episode onset: chronic. The problem has been gradually improving. There were more than 10 seizures. Pertinent negatives include no headaches, no visual disturbance, no chest pain and no cough. There has been no fever.     Review of Systems   Constitutional: Negative for activity change.   HENT: Negative for ear pain.    Eyes: Negative for pain and visual disturbance.   Respiratory: Negative for cough, shortness of breath and wheezing.    Cardiovascular: Negative for chest pain.   Gastrointestinal: Negative for abdominal pain.   Genitourinary: Negative for dysuria.   Musculoskeletal: Negative for neck pain.   Skin: Positive for wound.   Neurological: Positive for seizures. Negative for headaches.       Objective:      Physical Exam   Constitutional: He appears well-developed and well-nourished. No distress.   HENT:   Head: Normocephalic and atraumatic.   Cardiovascular: Normal rate and regular rhythm.   Pulmonary/Chest: Effort normal and breath sounds normal. No respiratory distress. He has no wheezes.   Abdominal: Soft. Bowel sounds are normal. There is no tenderness.   Musculoskeletal: He exhibits no edema.   Neurological: He is alert. No sensory deficit. He exhibits normal muscle tone.   Rhythmic lue tremor.     Skin: Skin is warm and dry. He is not diaphoretic. No erythema.   Stage 3 ulcer of right ankle, mirlande 3x5 cm, some slough noted to area.   Psychiatric: His speech is normal. His affect is labile and inappropriate. He is withdrawn. He is inattentive.   Nursing note and vitals reviewed.      Assessment:       1. Seizures    2. Undifferentiated schizophrenia    3. Encounter for long-term  (current) use of medications    4. Skin ulcer of right ankle, limited to breakdown of skin    5. Right arm pain    6. Smoker        Plan:     Problem List Items Addressed This Visit        Psychiatric    Schizophrenia (Chronic)    Overview     Sees Dr. Lindsay - Psychiatry.  [facility].  Taking Risperdal, vimpat         Relevant Medications    lacosamide (VIMPAT) 50 mg Tab    Encounter for long-term (current) use of medications    Relevant Orders    Levetiracetam level    VALPROIC ACID    CBC auto differential    Comprehensive metabolic panel    TSH       Derm    Skin ulcer of right ankle, limited to breakdown of skin    Overview     Pt has wound care to home, M and Friday. Going to [facility] on Wednesday for wound care.  No abx at this time.  Wound healing well.            Orthopedic    Right arm pain    Relevant Orders    Ambulatory Referral to Orthopedics       Other    Smoker (Chronic)    Relevant Orders    Ambulatory referral to Smoking Cessation Program      Other Visit Diagnoses     Seizures  (Chronic)   -  Primary    Relevant Medications    divalproex ER (DEPAKOTE ER) 250 MG 24 hr tablet    divalproex (DEPAKOTE) 500 MG TbEC    levETIRAcetam (KEPPRA) 1000 MG tablet

## 2019-05-01 DIAGNOSIS — R56.9 SEIZURES: Chronic | ICD-10-CM

## 2019-05-01 RX ORDER — DIVALPROEX SODIUM 500 MG/1
500 TABLET, DELAYED RELEASE ORAL 3 TIMES DAILY
Qty: 270 TABLET | Refills: 0 | Status: SHIPPED | OUTPATIENT
Start: 2019-05-01 | End: 2019-07-10 | Stop reason: SDUPTHER

## 2019-05-01 RX ORDER — DIVALPROEX SODIUM 250 MG/1
250 TABLET, FILM COATED, EXTENDED RELEASE ORAL 3 TIMES DAILY
Qty: 270 TABLET | Refills: 1 | Status: SHIPPED | OUTPATIENT
Start: 2019-05-01 | End: 2019-07-10 | Stop reason: SDUPTHER

## 2019-05-01 NOTE — TELEPHONE ENCOUNTER
Please resend prescription for Depakote 250 mg ER and Depakote 500 mg. Both was ordered to take 1 tablet by mouth 3 times daily when discharged for olol. Pended new prescriptions, please check

## 2019-05-06 ENCOUNTER — HOSPITAL ENCOUNTER (EMERGENCY)
Facility: HOSPITAL | Age: 59
Discharge: HOME | End: 2019-05-06
Attending: EMERGENCY MEDICINE
Payer: MEDICARE

## 2019-05-06 VITALS
HEIGHT: 70 IN | RESPIRATION RATE: 18 BRPM | HEART RATE: 66 BPM | WEIGHT: 141.13 LBS | DIASTOLIC BLOOD PRESSURE: 58 MMHG | TEMPERATURE: 99 F | SYSTOLIC BLOOD PRESSURE: 126 MMHG | OXYGEN SATURATION: 96 % | BODY MASS INDEX: 20.21 KG/M2

## 2019-05-06 DIAGNOSIS — D64.9 ANEMIA, UNSPECIFIED TYPE: Primary | ICD-10-CM

## 2019-05-06 DIAGNOSIS — N30.00 ACUTE CYSTITIS WITHOUT HEMATURIA: ICD-10-CM

## 2019-05-06 DIAGNOSIS — R41.0 CONFUSION: ICD-10-CM

## 2019-05-06 LAB
ALBUMIN SERPL BCP-MCNC: 3.5 G/DL (ref 3.5–5.2)
ALP SERPL-CCNC: 195 U/L (ref 55–135)
ALT SERPL W/O P-5'-P-CCNC: 6 U/L (ref 10–44)
AMPHET+METHAMPHET UR QL: NEGATIVE
ANION GAP SERPL CALC-SCNC: 11 MMOL/L (ref 8–16)
APAP SERPL-MCNC: <3 UG/ML (ref 10–20)
AST SERPL-CCNC: 13 U/L (ref 10–40)
BACTERIA #/AREA URNS AUTO: ABNORMAL /HPF
BARBITURATES UR QL SCN>200 NG/ML: NEGATIVE
BASOPHILS # BLD AUTO: 0.02 K/UL (ref 0–0.2)
BASOPHILS NFR BLD: 0.3 % (ref 0–1.9)
BENZODIAZ UR QL SCN>200 NG/ML: NORMAL
BILIRUB SERPL-MCNC: 0.4 MG/DL (ref 0.1–1)
BILIRUB UR QL STRIP: NEGATIVE
BUN SERPL-MCNC: 7 MG/DL (ref 6–20)
BZE UR QL SCN: NEGATIVE
CALCIUM SERPL-MCNC: 9.6 MG/DL (ref 8.7–10.5)
CANNABINOIDS UR QL SCN: NORMAL
CHLORIDE SERPL-SCNC: 100 MMOL/L (ref 95–110)
CLARITY UR REFRACT.AUTO: CLEAR
CO2 SERPL-SCNC: 30 MMOL/L (ref 23–29)
COLOR UR AUTO: ABNORMAL
CREAT SERPL-MCNC: 0.7 MG/DL (ref 0.5–1.4)
CREAT UR-MCNC: 173.9 MG/DL (ref 23–375)
DIFFERENTIAL METHOD: ABNORMAL
EOSINOPHIL # BLD AUTO: 0.4 K/UL (ref 0–0.5)
EOSINOPHIL NFR BLD: 5.6 % (ref 0–8)
ERYTHROCYTE [DISTWIDTH] IN BLOOD BY AUTOMATED COUNT: 15 % (ref 11.5–14.5)
EST. GFR  (AFRICAN AMERICAN): >60 ML/MIN/1.73 M^2
EST. GFR  (NON AFRICAN AMERICAN): >60 ML/MIN/1.73 M^2
ETHANOL SERPL-MCNC: <10 MG/DL
GLUCOSE SERPL-MCNC: 86 MG/DL (ref 70–110)
GLUCOSE UR QL STRIP: NEGATIVE
HCT VFR BLD AUTO: 38.2 % (ref 40–54)
HGB BLD-MCNC: 13.2 G/DL (ref 14–18)
HGB UR QL STRIP: NEGATIVE
KETONES UR QL STRIP: NEGATIVE
LEUKOCYTE ESTERASE UR QL STRIP: ABNORMAL
LYMPHOCYTES # BLD AUTO: 3.2 K/UL (ref 1–4.8)
LYMPHOCYTES NFR BLD: 49 % (ref 18–48)
MCH RBC QN AUTO: 31.2 PG (ref 27–31)
MCHC RBC AUTO-ENTMCNC: 34.6 G/DL (ref 32–36)
MCV RBC AUTO: 90 FL (ref 82–98)
METHADONE UR QL SCN>300 NG/ML: NEGATIVE
MICROSCOPIC COMMENT: ABNORMAL
MONOCYTES # BLD AUTO: 0.5 K/UL (ref 0.3–1)
MONOCYTES NFR BLD: 7.3 % (ref 4–15)
NEUTROPHILS # BLD AUTO: 2.4 K/UL (ref 1.8–7.7)
NEUTROPHILS NFR BLD: 37.6 % (ref 38–73)
NITRITE UR QL STRIP: POSITIVE
OPIATES UR QL SCN: NEGATIVE
PCP UR QL SCN>25 NG/ML: NEGATIVE
PH UR STRIP: 6 [PH] (ref 5–8)
PLATELET # BLD AUTO: 208 K/UL (ref 150–350)
PMV BLD AUTO: 9.7 FL (ref 9.2–12.9)
POTASSIUM SERPL-SCNC: 3.9 MMOL/L (ref 3.5–5.1)
PROT SERPL-MCNC: 7.8 G/DL (ref 6–8.4)
PROT UR QL STRIP: NEGATIVE
RBC # BLD AUTO: 4.23 M/UL (ref 4.6–6.2)
RBC #/AREA URNS AUTO: 0 /HPF (ref 0–4)
SALICYLATES SERPL-MCNC: <5 MG/DL (ref 15–30)
SODIUM SERPL-SCNC: 141 MMOL/L (ref 136–145)
SP GR UR STRIP: 1.02 (ref 1–1.03)
SQUAMOUS #/AREA URNS AUTO: 3 /HPF
TOXICOLOGY INFORMATION: NORMAL
TSH SERPL DL<=0.005 MIU/L-ACNC: 0.88 UIU/ML (ref 0.4–4)
URN SPEC COLLECT METH UR: ABNORMAL
UROBILINOGEN UR STRIP-ACNC: ABNORMAL EU/DL
VALPROATE SERPL-MCNC: 116.5 UG/ML (ref 50–100)
WBC # BLD AUTO: 6.45 K/UL (ref 3.9–12.7)
WBC #/AREA URNS AUTO: 22 /HPF (ref 0–5)

## 2019-05-06 PROCEDURE — 25000003 PHARM REV CODE 250: Mod: ER | Performed by: EMERGENCY MEDICINE

## 2019-05-06 PROCEDURE — 80329 ANALGESICS NON-OPIOID 1 OR 2: CPT | Mod: ER

## 2019-05-06 PROCEDURE — 93010 EKG 12-LEAD: ICD-10-PCS | Mod: ,,, | Performed by: NUCLEAR MEDICINE

## 2019-05-06 PROCEDURE — 80307 DRUG TEST PRSMV CHEM ANLYZR: CPT | Mod: ER

## 2019-05-06 PROCEDURE — 96365 THER/PROPH/DIAG IV INF INIT: CPT | Mod: ER

## 2019-05-06 PROCEDURE — S4991 NICOTINE PATCH NONLEGEND: HCPCS | Mod: ER | Performed by: EMERGENCY MEDICINE

## 2019-05-06 PROCEDURE — 87077 CULTURE AEROBIC IDENTIFY: CPT

## 2019-05-06 PROCEDURE — 80320 DRUG SCREEN QUANTALCOHOLS: CPT | Mod: ER

## 2019-05-06 PROCEDURE — 63600175 PHARM REV CODE 636 W HCPCS: Mod: ER | Performed by: EMERGENCY MEDICINE

## 2019-05-06 PROCEDURE — 85025 COMPLETE CBC W/AUTO DIFF WBC: CPT | Mod: ER

## 2019-05-06 PROCEDURE — 87088 URINE BACTERIA CULTURE: CPT

## 2019-05-06 PROCEDURE — 81000 URINALYSIS NONAUTO W/SCOPE: CPT | Mod: 59,ER

## 2019-05-06 PROCEDURE — 87086 URINE CULTURE/COLONY COUNT: CPT

## 2019-05-06 PROCEDURE — 87186 SC STD MICRODIL/AGAR DIL: CPT

## 2019-05-06 PROCEDURE — 93010 ELECTROCARDIOGRAM REPORT: CPT | Mod: ,,, | Performed by: NUCLEAR MEDICINE

## 2019-05-06 PROCEDURE — 84443 ASSAY THYROID STIM HORMONE: CPT | Mod: ER

## 2019-05-06 PROCEDURE — 80053 COMPREHEN METABOLIC PANEL: CPT | Mod: ER

## 2019-05-06 PROCEDURE — 93005 ELECTROCARDIOGRAM TRACING: CPT | Mod: ER

## 2019-05-06 PROCEDURE — 99900035 HC TECH TIME PER 15 MIN (STAT): Mod: ER

## 2019-05-06 PROCEDURE — 80177 DRUG SCRN QUAN LEVETIRACETAM: CPT

## 2019-05-06 PROCEDURE — 80164 ASSAY DIPROPYLACETIC ACD TOT: CPT

## 2019-05-06 PROCEDURE — 99285 EMERGENCY DEPT VISIT HI MDM: CPT | Mod: 25,ER

## 2019-05-06 RX ORDER — IBUPROFEN 200 MG
1 TABLET ORAL
Status: DISCONTINUED | OUTPATIENT
Start: 2019-05-06 | End: 2019-05-06

## 2019-05-06 RX ORDER — CEFDINIR 300 MG/1
300 CAPSULE ORAL 2 TIMES DAILY
Qty: 14 CAPSULE | Refills: 0 | Status: SHIPPED | OUTPATIENT
Start: 2019-05-06 | End: 2019-05-13

## 2019-05-06 RX ADMIN — NICOTINE 1 PATCH: 21 PATCH, EXTENDED RELEASE TRANSDERMAL at 03:05

## 2019-05-06 RX ADMIN — CEFTRIAXONE 1 G: 1 INJECTION, SOLUTION INTRAVENOUS at 03:05

## 2019-05-06 NOTE — ED NOTES
When attempting to take patient's gold colored necklace off from around his neck, patient ripped necklace from around his neck, breaking it. Necklace placed in patient belonging bag with clothing at bedside.

## 2019-05-06 NOTE — ED NOTES
PT calm, relaxed, and cooperative at this time.Pt resting in bed. NAD, VSS, RR equal and unlabored. Will continue to monitorPt alert and oriented, calm/cooperative, and in NAD.

## 2019-05-06 NOTE — ED NOTES
Patients home health nurse at bedside at this time speaking with pt to get baseline LOC eval. Nurse going to pt's mothers house a this time to check with her on having pt d/c or possible admit.
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
## 2019-05-06 NOTE — ED NOTES
Pt dressed with nicotine patch removed and disposed of in proper container in med room. Awaiting ride at this time.

## 2019-05-06 NOTE — ED NOTES
PT became agitated and confrontational. Pt using profanity towards staff and threatening behavior. Pt's belongings were removed from room and yellow metal colored necklace broken off by pt. Items secured in locker. Pt broke latch to necklace.

## 2019-05-06 NOTE — ED NOTES
Meal given to pt with milk and applesauce. Pt refused at first, but is currently working on meal at this time.

## 2019-05-08 ENCOUNTER — TELEPHONE (OUTPATIENT)
Dept: INTERNAL MEDICINE | Facility: CLINIC | Age: 59
End: 2019-05-08

## 2019-05-08 LAB
BACTERIA UR CULT: NORMAL
LEVETIRACETAM SERPL-MCNC: 56.7 UG/ML (ref 3–60)

## 2019-05-08 NOTE — TELEPHONE ENCOUNTER
Called pt per Dr. Koo about medication. no answer, unable to leave message due to VM full. Will call back.called 2 times

## 2019-05-08 NOTE — TELEPHONE ENCOUNTER
----- Message from Sumeet Koo MD sent at 5/8/2019  8:47 AM CDT -----  Pls hold valproic acid for 1 day, as his level is very high right now. F/u in office tomm.  Depakote level was normal in ED.

## 2019-05-08 NOTE — TELEPHONE ENCOUNTER
Spoke with pt mother about medication to hold. Voiced understanding. Scheduled pt appt to see Dr. Koo tomorrow at 10:40 am. Voiced understanding

## 2019-05-15 ENCOUNTER — TELEPHONE (OUTPATIENT)
Dept: ORTHOPEDICS | Facility: CLINIC | Age: 59
End: 2019-05-15

## 2019-05-15 NOTE — TELEPHONE ENCOUNTER
Called the patient to see if they wanted to reschedule their appointment today with Dr. Wood. I got the patient reschedule for 5/27/19 at 8:40. verbalized understanding.FP

## 2019-05-20 DIAGNOSIS — M25.511 RIGHT SHOULDER PAIN, UNSPECIFIED CHRONICITY: Primary | ICD-10-CM

## 2019-05-31 DIAGNOSIS — R56.9 SEIZURES: Chronic | ICD-10-CM

## 2019-05-31 RX ORDER — LEVETIRACETAM 1000 MG/1
1500 TABLET ORAL 2 TIMES DAILY
Qty: 60 TABLET | Refills: 0 | Status: SHIPPED | OUTPATIENT
Start: 2019-05-31 | End: 2019-06-19 | Stop reason: SDUPTHER

## 2019-06-03 ENCOUNTER — TELEPHONE (OUTPATIENT)
Dept: ORTHOPEDICS | Facility: CLINIC | Age: 59
End: 2019-06-03

## 2019-06-03 NOTE — TELEPHONE ENCOUNTER
Called pt to reschedule his apt due to provider being out of office, pt is reschedule to 6/5. Pt understood.

## 2019-06-19 DIAGNOSIS — R56.9 SEIZURES: Chronic | ICD-10-CM

## 2019-06-19 RX ORDER — LEVETIRACETAM 1000 MG/1
1500 TABLET ORAL 2 TIMES DAILY
Qty: 60 TABLET | Refills: 0 | Status: SHIPPED | OUTPATIENT
Start: 2019-06-19 | End: 2019-07-08 | Stop reason: SDUPTHER

## 2019-06-20 ENCOUNTER — PATIENT OUTREACH (OUTPATIENT)
Dept: ADMINISTRATIVE | Facility: HOSPITAL | Age: 59
End: 2019-06-20

## 2019-07-08 DIAGNOSIS — R56.9 SEIZURES: Chronic | ICD-10-CM

## 2019-07-09 ENCOUNTER — TELEPHONE (OUTPATIENT)
Dept: INTERNAL MEDICINE | Facility: CLINIC | Age: 59
End: 2019-07-09

## 2019-07-09 ENCOUNTER — PATIENT OUTREACH (OUTPATIENT)
Dept: ADMINISTRATIVE | Facility: HOSPITAL | Age: 59
End: 2019-07-09

## 2019-07-09 RX ORDER — DIVALPROEX SODIUM 500 MG/1
TABLET, DELAYED RELEASE ORAL
Qty: 30 TABLET | Refills: 0 | OUTPATIENT
Start: 2019-07-09

## 2019-07-09 RX ORDER — LEVETIRACETAM 1000 MG/1
1500 TABLET ORAL 2 TIMES DAILY
Qty: 60 TABLET | Refills: 0 | Status: SHIPPED | OUTPATIENT
Start: 2019-07-09 | End: 2019-07-10 | Stop reason: SDUPTHER

## 2019-07-09 RX ORDER — PROPRANOLOL HYDROCHLORIDE 10 MG/1
TABLET ORAL
Qty: 30 TABLET | Refills: 0 | OUTPATIENT
Start: 2019-07-09

## 2019-07-09 NOTE — TELEPHONE ENCOUNTER
----- Message from Sumeet Koo MD sent at 7/9/2019  2:32 PM CDT -----  [place] needs to come in for depakote level

## 2019-07-10 ENCOUNTER — TELEPHONE (OUTPATIENT)
Dept: INTERNAL MEDICINE | Facility: CLINIC | Age: 59
End: 2019-07-10

## 2019-07-10 ENCOUNTER — OFFICE VISIT (OUTPATIENT)
Dept: INTERNAL MEDICINE | Facility: CLINIC | Age: 59
End: 2019-07-10
Payer: MEDICARE

## 2019-07-10 ENCOUNTER — LAB VISIT (OUTPATIENT)
Dept: LAB | Facility: HOSPITAL | Age: 59
End: 2019-07-10
Attending: FAMILY MEDICINE
Payer: MEDICARE

## 2019-07-10 VITALS
TEMPERATURE: 97 F | BODY MASS INDEX: 17.58 KG/M2 | HEART RATE: 84 BPM | HEIGHT: 70 IN | RESPIRATION RATE: 19 BRPM | SYSTOLIC BLOOD PRESSURE: 126 MMHG | DIASTOLIC BLOOD PRESSURE: 71 MMHG | WEIGHT: 122.81 LBS | OXYGEN SATURATION: 96 %

## 2019-07-10 DIAGNOSIS — I10 ESSENTIAL HYPERTENSION: ICD-10-CM

## 2019-07-10 DIAGNOSIS — Z79.899 ENCOUNTER FOR LONG-TERM (CURRENT) USE OF MEDICATIONS: Primary | ICD-10-CM

## 2019-07-10 DIAGNOSIS — E44.0 MODERATE PROTEIN-CALORIE MALNUTRITION: ICD-10-CM

## 2019-07-10 DIAGNOSIS — Z79.899 ENCOUNTER FOR LONG-TERM (CURRENT) USE OF MEDICATIONS: ICD-10-CM

## 2019-07-10 DIAGNOSIS — Z91.148 NONCOMPLIANCE WITH MEDICATIONS: ICD-10-CM

## 2019-07-10 DIAGNOSIS — F12.90 MARIJUANA USE: ICD-10-CM

## 2019-07-10 DIAGNOSIS — T42.6X5A VALPROIC ACID-INDUCED TREMOR: Chronic | ICD-10-CM

## 2019-07-10 DIAGNOSIS — L97.311 SKIN ULCER OF RIGHT ANKLE, LIMITED TO BREAKDOWN OF SKIN: ICD-10-CM

## 2019-07-10 DIAGNOSIS — R56.9 SEIZURES: Primary | Chronic | ICD-10-CM

## 2019-07-10 DIAGNOSIS — F12.90 MARIJUANA USE, CONTINUOUS: ICD-10-CM

## 2019-07-10 DIAGNOSIS — G25.1 VALPROIC ACID-INDUCED TREMOR: Chronic | ICD-10-CM

## 2019-07-10 PROBLEM — B35.1 ONYCHOMYCOSIS: Status: RESOLVED | Noted: 2019-03-12 | Resolved: 2019-07-10

## 2019-07-10 PROCEDURE — 99999 PR PBB SHADOW E&M-EST. PATIENT-LVL IV: ICD-10-PCS | Mod: PBBFAC,,, | Performed by: FAMILY MEDICINE

## 2019-07-10 PROCEDURE — 3078F PR MOST RECENT DIASTOLIC BLOOD PRESSURE < 80 MM HG: ICD-10-PCS | Mod: CPTII,S$GLB,, | Performed by: FAMILY MEDICINE

## 2019-07-10 PROCEDURE — 3008F BODY MASS INDEX DOCD: CPT | Mod: CPTII,S$GLB,, | Performed by: FAMILY MEDICINE

## 2019-07-10 PROCEDURE — 80177 DRUG SCRN QUAN LEVETIRACETAM: CPT

## 2019-07-10 PROCEDURE — 99999 PR PBB SHADOW E&M-EST. PATIENT-LVL IV: CPT | Mod: PBBFAC,,, | Performed by: FAMILY MEDICINE

## 2019-07-10 PROCEDURE — 80053 COMPREHEN METABOLIC PANEL: CPT | Mod: PO

## 2019-07-10 PROCEDURE — 3078F DIAST BP <80 MM HG: CPT | Mod: CPTII,S$GLB,, | Performed by: FAMILY MEDICINE

## 2019-07-10 PROCEDURE — 3008F PR BODY MASS INDEX (BMI) DOCUMENTED: ICD-10-PCS | Mod: CPTII,S$GLB,, | Performed by: FAMILY MEDICINE

## 2019-07-10 PROCEDURE — 3074F PR MOST RECENT SYSTOLIC BLOOD PRESSURE < 130 MM HG: ICD-10-PCS | Mod: CPTII,S$GLB,, | Performed by: FAMILY MEDICINE

## 2019-07-10 PROCEDURE — 99215 PR OFFICE/OUTPT VISIT, EST, LEVL V, 40-54 MIN: ICD-10-PCS | Mod: S$GLB,,, | Performed by: FAMILY MEDICINE

## 2019-07-10 PROCEDURE — 99215 OFFICE O/P EST HI 40 MIN: CPT | Mod: S$GLB,,, | Performed by: FAMILY MEDICINE

## 2019-07-10 PROCEDURE — 36415 COLL VENOUS BLD VENIPUNCTURE: CPT | Mod: PO

## 2019-07-10 PROCEDURE — 3074F SYST BP LT 130 MM HG: CPT | Mod: CPTII,S$GLB,, | Performed by: FAMILY MEDICINE

## 2019-07-10 PROCEDURE — 80164 ASSAY DIPROPYLACETIC ACD TOT: CPT

## 2019-07-10 RX ORDER — DIVALPROEX SODIUM 250 MG/1
250 TABLET, FILM COATED, EXTENDED RELEASE ORAL 3 TIMES DAILY
Qty: 270 TABLET | Refills: 0 | Status: SHIPPED | OUTPATIENT
Start: 2019-07-10 | End: 2019-10-08

## 2019-07-10 RX ORDER — DRONABINOL 2.5 MG/1
2.5 CAPSULE ORAL
Qty: 60 CAPSULE | Refills: 0 | Status: SHIPPED | OUTPATIENT
Start: 2019-07-10

## 2019-07-10 RX ORDER — LEVETIRACETAM 1000 MG/1
1500 TABLET ORAL 2 TIMES DAILY
Qty: 60 TABLET | Refills: 0 | Status: SHIPPED | OUTPATIENT
Start: 2019-07-10

## 2019-07-10 RX ORDER — DIVALPROEX SODIUM 500 MG/1
500 TABLET, DELAYED RELEASE ORAL 3 TIMES DAILY
Qty: 270 TABLET | Refills: 0 | Status: SHIPPED | OUTPATIENT
Start: 2019-07-10

## 2019-07-10 NOTE — PROGRESS NOTES
Subjective:       Patient ID: Smooth Spring is a 59 y.o. male.    Chief Complaint: Medication Refill    Seizure free since March 7th 2019, but pt had forgotten to take medications.    Ankle Ulcer:  O: months  L: right ankle  D: improved, resolved  C:  Shan:   Exac: smoking      Seizures    This is a chronic problem. Episode onset: chronic. The problem has been gradually improving. There were more than 10 seizures. Pertinent negatives include no headaches, no visual disturbance, no chest pain and no cough. There has been no fever.     Review of Systems   Constitutional: Negative for activity change.   HENT: Negative for ear pain.    Eyes: Negative for pain and visual disturbance.   Respiratory: Negative for cough, shortness of breath and wheezing.    Cardiovascular: Negative for chest pain.   Gastrointestinal: Negative for abdominal pain.   Genitourinary: Negative for dysuria.   Musculoskeletal: Negative for neck pain.   Skin: Positive for wound. Negative for rash.   Neurological: Positive for tremors and seizures. Negative for headaches.   Psychiatric/Behavioral: Negative for hallucinations and suicidal ideas.       Objective:      Physical Exam   Constitutional: He appears well-developed and well-nourished. No distress.   frail   HENT:   Head: Normocephalic and atraumatic.   Cardiovascular: Normal rate and regular rhythm.   Pulmonary/Chest: Effort normal and breath sounds normal. No respiratory distress. He has no wheezes.   Abdominal: Soft. Bowel sounds are normal. There is no tenderness.   Musculoskeletal: He exhibits no edema.   Neurological: He is alert. No sensory deficit. He exhibits normal muscle tone.   Rhythmic lue tremor.     Skin: Skin is warm and dry. He is not diaphoretic. No erythema.   Improved right ankle ulcer with scab overlying.  Christopher 2.5 x 1 cm   Psychiatric: His speech is normal.   Nursing note and vitals reviewed.      Assessment:       1. Seizures    2. Essential hypertension    3. Skin ulcer of  right ankle, limited to breakdown of skin    4. Marijuana use    5. Valproic acid-induced tremor    6. Encounter for long-term (current) use of medications    7. Noncompliance with medications    8. Moderate protein-calorie malnutrition    9. Marijuana use, continuous        Plan:     Problem List Items Addressed This Visit        Neuro    Valproic acid-induced tremor (Chronic)    Overview     Chronic, stable.  Seeing Neurology in June 2018              Psychiatric    Encounter for long-term (current) use of medications    Relevant Orders    Levetiracetam level    VALPROIC ACID    Marijuana use, continuous    Current Assessment & Plan     Explained to pt risks of only using marijuana, but not being compliant on med regimen.           Noncompliance with medications    Current Assessment & Plan     I dont think that pt will cope well with keeping his med regimen. Pt          Relevant Orders    Levetiracetam level    VALPROIC ACID    Marijuana use       Derm    Skin ulcer of right ankle, limited to breakdown of skin    Overview     Pt has wound care to home, M and Friday. Going to [facility] on Wednesday for wound care.  No abx at this time.  Wound healing well.            Cardiac/Vascular    Essential hypertension    Current Assessment & Plan     Controlled with diet            Endocrine    Moderate protein-calorie malnutrition    Current Assessment & Plan     Continues to lose weight. BMI at 17.62.  [facility] ref [facility] to [facility].    MEDICAL DECISION MAKING: Moderate to high complexity.  Overall, the multiple problems listed are of moderate to high severity that may impact quality of life and activities of daily living. Side effects of medications, treatment plan as well as options and alternatives reviewed and discussed with patient. There was counseling of patient concerning these issues.           Relevant Medications    dronabinol (MARINOL) 2.5 MG capsule    Other Relevant Orders    Comprehensive metabolic panel       Other Visit Diagnoses     Seizures  (Chronic)   -  Primary    Relevant Medications    divalproex (DEPAKOTE) 500 MG TbEC    divalproex ER (DEPAKOTE ER) 250 MG 24 hr tablet    levETIRAcetam (KEPPRA) 1000 MG tablet

## 2019-07-10 NOTE — ASSESSMENT & PLAN NOTE
Continues to lose weight. BMI at 17.62.  Have ref [facility] to [facility].    MEDICAL DECISION MAKING: Moderate to high complexity.  Overall, the multiple problems listed are of moderate to high severity that may impact quality of life and activities of daily living. Side effects of medications, treatment plan as well as options and alternatives reviewed and discussed with patient. There was counseling of patient concerning these issues.

## 2019-07-10 NOTE — TELEPHONE ENCOUNTER
Lab informed us that pt was a hard stick and blood clotted. Labs need to be reordered. Pt will come on tomorrow for a repeat draw.

## 2019-07-11 LAB
ALBUMIN SERPL BCP-MCNC: 3 G/DL (ref 3.5–5.2)
ALP SERPL-CCNC: 762 U/L (ref 55–135)
ALT SERPL W/O P-5'-P-CCNC: 7 U/L (ref 10–44)
ANION GAP SERPL CALC-SCNC: 10 MMOL/L (ref 8–16)
AST SERPL-CCNC: 15 U/L (ref 10–40)
BILIRUB SERPL-MCNC: 0.8 MG/DL (ref 0.1–1)
BUN SERPL-MCNC: 9 MG/DL (ref 6–20)
CALCIUM SERPL-MCNC: 8.8 MG/DL (ref 8.7–10.5)
CHLORIDE SERPL-SCNC: 97 MMOL/L (ref 95–110)
CO2 SERPL-SCNC: 27 MMOL/L (ref 23–29)
CREAT SERPL-MCNC: 0.7 MG/DL (ref 0.5–1.4)
EST. GFR  (AFRICAN AMERICAN): >60 ML/MIN/1.73 M^2
EST. GFR  (NON AFRICAN AMERICAN): >60 ML/MIN/1.73 M^2
GLUCOSE SERPL-MCNC: 96 MG/DL (ref 70–110)
POTASSIUM SERPL-SCNC: 3.4 MMOL/L (ref 3.5–5.1)
PROT SERPL-MCNC: 7 G/DL (ref 6–8.4)
SODIUM SERPL-SCNC: 134 MMOL/L (ref 136–145)

## 2019-07-12 LAB — VALPROATE SERPL-MCNC: 97.8 UG/ML (ref 50–100)

## 2019-07-15 DIAGNOSIS — F20.3 UNDIFFERENTIATED SCHIZOPHRENIA: Chronic | ICD-10-CM

## 2019-07-15 LAB — LEVETIRACETAM SERPL-MCNC: 80.9 UG/ML (ref 3–60)

## 2019-07-15 NOTE — PROGRESS NOTES
Please call pt with abnormal results. Pt does not need appt at this time, unless they have questions or wish to further discuss.  Pt is supra therapeutic on Keppra, pls decrease dose to 1000mg daily.

## 2019-07-16 RX ORDER — LACOSAMIDE 50 MG/1
TABLET ORAL
Qty: 270 TABLET | Refills: 0 | Status: SHIPPED | OUTPATIENT
Start: 2019-07-16

## 2019-07-28 DIAGNOSIS — R56.9 SEIZURES: Chronic | ICD-10-CM

## 2019-07-29 RX ORDER — LEVETIRACETAM 1000 MG/1
1500 TABLET ORAL 2 TIMES DAILY
Qty: 60 TABLET | Refills: 0 | OUTPATIENT
Start: 2019-07-29

## 2019-07-31 ENCOUNTER — TELEPHONE (OUTPATIENT)
Dept: INTERNAL MEDICINE | Facility: CLINIC | Age: 59
End: 2019-07-31

## 2019-07-31 NOTE — TELEPHONE ENCOUNTER
----- Message from Kristen Martinez sent at 7/31/2019  1:19 PM CDT -----  Contact: muna dunn  needs recent medication list now faxed to [phone]//ph:[phone]

## 2019-09-02 DIAGNOSIS — F20.3 UNDIFFERENTIATED SCHIZOPHRENIA: Chronic | ICD-10-CM

## 2019-09-03 RX ORDER — LACOSAMIDE 50 MG/1
TABLET ORAL
Qty: 270 TABLET | Refills: 0 | OUTPATIENT
Start: 2019-09-03

## 2019-10-21 DIAGNOSIS — R56.9 SEIZURES: Chronic | ICD-10-CM

## 2019-10-21 RX ORDER — DIVALPROEX SODIUM 500 MG/1
TABLET, DELAYED RELEASE ORAL
Qty: 270 TABLET | Refills: 0 | OUTPATIENT
Start: 2019-10-21

## 2020-05-04 ENCOUNTER — TELEPHONE (OUTPATIENT)
Dept: INTERNAL MEDICINE | Facility: CLINIC | Age: 60
End: 2020-05-04

## 2020-08-24 ENCOUNTER — PATIENT OUTREACH (OUTPATIENT)
Dept: ADMINISTRATIVE | Facility: HOSPITAL | Age: 60
End: 2020-08-24

## 2022-07-18 NOTE — PROGRESS NOTES
Good fluid intake (at least 72 ounces daily).  More fiber in your diet, such as bran foods or fiber supplements like Citrucel or Metamucil.  Stool softeners (Colace or docusate 100 mg gel capsules) twice daily as needed for hard stool.  If it has been over 2-3 days with no bowel movement, would use Milk of Magnesia (30 mL twice daily as needed) OR MiraLAX (17 gm in 8 ounces beverage twice daily as needed). If still no results after two doses of Milk of Magnesia or MiraLAX, then you may use Senokot (8.6 mg 2-4 tabs up to twice daily as needed).   Pre visit chart audit letter sent.

## 2023-12-28 NOTE — ED PROVIDER NOTES
Encounter Date: 1/23/2019       History     Chief Complaint   Patient presents with    Seizures     at home, hx seizures.      Patient currently presents with complaint of recurrent seizure.  This occurred just prior to arrival.  Duration of seizure was noted to be about 10-15 min.  Tonic-clonic activity is localized primarily to the LUE though he becomes lethargic and poorly responsive during activity.  At this point the patient has partially recovered from the postictal state.  Patient does have a history of prior seizures for which Vimpat, Keppra, and Depakote is taken at home.  There have not been recent changes to the medication regimen.  Family not available at present for additional history.  Laceration noted to chin following seizure.  Last tetanus 9/2016.          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    Weakness      Past Surgical History:   Procedure Laterality Date    BRAIN SURGERY      KNEE SURGERY      REVISION OF SCAR ON FACE/HEAD      SHOULDER SURGERY       Family History   Problem Relation Age of Onset    Kidney disease Mother     Diabetes Mother      Social History     Tobacco Use    Smoking status: Current Every Day Smoker     Packs/day: 1.00     Years: 32.00     Pack years: 32.00     Types: Cigarettes    Smokeless tobacco: Never Used   Substance Use Topics    Alcohol use: No    Drug use: No     Review of Systems   Unable to perform ROS: Mental status change       Physical Exam     Initial Vitals   BP Pulse Resp Temp SpO2   01/23/19 1920 01/23/19 1920 01/23/19 1920 01/23/19 1921 01/23/19 1920   (!) 140/82 106 (!) 62 (!) 103.1 °F (39.5 °C) (!) 93 %      MAP       --                Vitals:    01/23/19 2245 01/23/19 2300 01/23/19 2327 01/23/19 2332   BP:  98/60 (!) 197/90 136/63 (!) 103/59   Pulse: 76 69 70 73   Resp: 18 18 13 14   Temp:       TempSrc:       SpO2: 100% 100% 97% 96%   Weight:        01/23/19 2345 01/24/19 0005 01/24/19 0012 01/24/19 0015   BP: 123/81  (!) 165/80 (!) 198/112   Pulse: 92  96 93   Resp: 20  (!) 24 (!) 26   Temp:  99.4 °F (37.4 °C)     TempSrc:  Rectal     SpO2: 98%  (!) 83% 95%   Weight:        01/24/19 0023 01/24/19 0030 01/24/19 0034 01/24/19 0047   BP: 109/84 118/77  120/64   Pulse: 92 90  90   Resp: (!) 22 (!) 22  18   Temp:       TempSrc:       SpO2: 97% 98%  (!) 94%   Weight:   62.7 kg (138 lb 3.7 oz)     01/24/19 0100 01/24/19 0134 01/24/19 0222   BP: 125/72 133/79 114/60   Pulse: 90 93 102   Resp: 20 16 20   Temp:      TempSrc:      SpO2: 100% 100% 95%   Weight:            Physical Exam    Nursing note and vitals reviewed.  Constitutional: He appears well-developed and well-nourished. He appears lethargic. He is not diaphoretic. No distress.   HENT:   Head: Normocephalic. Head is with laceration.       Right Ear: External ear normal.   Left Ear: External ear normal.   Nose: Nose normal.   Mouth/Throat: Oropharynx is clear and moist.   Eyes: Conjunctivae and EOM are normal. Pupils are equal, round, and reactive to light. No scleral icterus.   Neck: Neck supple. No Brudzinski's sign and no Kernig's sign noted. No JVD present.   Cardiovascular: Normal rate, regular rhythm, normal heart sounds and intact distal pulses. Exam reveals no gallop and no friction rub.    No murmur heard.  Pulmonary/Chest: Breath sounds normal. No respiratory distress. He has no wheezes. He has no rhonchi. He has no rales.   Abdominal: Soft. Bowel sounds are normal. He exhibits no distension. There is no tenderness.   Musculoskeletal: Normal range of motion. He exhibits no edema.   Neurological: He has normal strength. He appears lethargic. No cranial nerve deficit. GCS eye subscore is 4. GCS verbal subscore is 4. GCS motor subscore is 6.   Skin: Skin is  warm and dry. No rash noted.   Psychiatric: He has a normal mood and affect. His behavior is normal.         ED Course   Lac Repair  Date/Time: 1/24/2019 3:09 AM  Performed by: Mitch Nation MD  Authorized by: Mitch Nation MD   Consent Done: Yes  Consent: Verbal consent obtained.  Risks and benefits: risks, benefits and alternatives were discussed  Consent given by: patient  Patient understanding: patient states understanding of the procedure being performed  Body area: head/neck  Location details: chin  Laceration length: 3 cm  Foreign bodies: no foreign bodies  Tendon involvement: none  Nerve involvement: none  Vascular damage: no  Anesthesia: local infiltration    Anesthesia:  Local Anesthetic: lidocaine 1% without epinephrine  Preparation: Patient was prepped and draped in the usual sterile fashion.  Amount of cleaning: extensive  Debridement: none  Skin closure: 4-0 Prolene  Number of sutures: 5  Technique: simple  Approximation: close  Approximation difficulty: simple  Dressing: antibiotic ointment and dressing applied  Patient tolerance: Patient tolerated the procedure well with no immediate complications        Labs Reviewed   CBC W/ AUTO DIFFERENTIAL - Abnormal; Notable for the following components:       Result Value    Platelets 135 (*)     Gran # (ANC) 7.9 (*)     Mono # 1.2 (*)     Lymph% 12.1 (*)     All other components within normal limits   COMPREHENSIVE METABOLIC PANEL - Abnormal; Notable for the following components:    Potassium 3.1 (*)     ALT <5 (*)     All other components within normal limits   URINALYSIS, REFLEX TO URINE CULTURE - Abnormal; Notable for the following components:    Specific Gravity, UA <=1.005 (*)     Occult Blood UA Trace (*)     All other components within normal limits    Narrative:     Preferred Collection Type->Urine, Clean Catch   MAGNESIUM - Abnormal; Notable for the following components:    Magnesium 1.5 (*)     All other components within normal limits    INFLUENZA A & B BY MOLECULAR   CULTURE, BLOOD   CULTURE, BLOOD   LACTIC ACID, PLASMA   VALPROIC ACID   CK   POCT GLUCOSE   POCT GLUCOSE          Imaging Results          CT Head Without Contrast (In process)                X-Ray Chest AP Portable (Final result)  Result time 01/23/19 20:52:47    Final result by Awais Guillaume Jr., MD (01/23/19 20:52:47)                 Impression:      No acute findings.      Electronically signed by: Awais Guillaume Jr., MD  Date:    01/23/2019  Time:    20:52             Narrative:    EXAMINATION:  XR CHEST AP PORTABLE    CLINICAL HISTORY:  SIRS;    TECHNIQUE:  Single frontal view of the chest was performed.    COMPARISON:  None    FINDINGS:  The lungs are clear, with normal appearance of pulmonary vasculature and no pleural effusion or pneumothorax.    The cardiac silhouette is normal in size. The hilar and mediastinal contours are unremarkable.    Chronic appearing deformity of the proximal right humerus.  There is an intramedullary nail within the right humerus.                                       Medical Decision Making:   ED Management:  Shortly after arrival, the patient began to demonstrate evidence of recurrent seizure activity with rhythmic motion of the left upper extremity at a frequency of approximately 300 oscillations per minute and return to a state of diminished arousal.  Patient was loaded with 500 mg IV Keppra as well as 2 separate doses of Ativan x2 mg approximately 5 min apart.  He experienced resolution of the seizure at that point.  Patient thereafter gradually returned to baseline level of alertness though he has chronic confusion owing to remote TBI.  Valproic acid levels are currently pending.  Fever present on arrival has now resolved and I see no apparent source for infection.    Patient once again began developing seizure activity of a similar character as the prior event.  Patient again receive Ativan 2 mg on 2 separate occasions approximately 5 min  apart without resolution.  After review of the patients records it appears he takes 1500mg of Keppra BID so we added an additional gram (for a total of 1500mg) given uncertainty of compliance, and we will proceed with a CT of the head.  Mitch Nation MD  00:15 AM    Activity resolved with Keppra.  Awakening near baseline.      Patient now for 3rd time during the ED encounter has begun having focal seizure activity.  Will load the patient with Cerebyx and continue to monitor closely.  Mitch Nation MD  1:41 AM    Seizure activity presently arrested.  Patient remains HD stable and without further fever.  Awaiting callback from [facility]    All historical, clinical, radiographic, and laboratory findings were reviewed with the patient/family in detail along with the indications for transfer to an outside facility (rather than admission to our facility in [place]) secondary to the patient's family preference and a need for  Neurology consultation and neuro/cardiac monitoring given the diagnosis of status epilepticus and transient fever.  Fever has resolved and I see no sign of infection on his workup.  Accordingly we suspect the fever was the result of his seizure activity though we will have to continue to monitor this closely.  All remaining questions and concerns were addressed at that time and the patient/family communicates understanding and agrees to proceed accordingly.  Similarly all pertinent details of the encounter were discussed with Dr Saran Lopez at [facility] ED via [facility] who agrees to accept the patient in transfer based on the needs/patient preferences outlined above.  [facility] secondary to a need for ongoing cardiac and neuro mnitoring en route.                          Clinical Impression:   The primary encounter diagnosis was Status epilepticus. Diagnoses of History of traumatic brain injury, Parkinson's disease,  and Chin laceration, initial encounter were also pertinent to this visit.                                 Mitch Nation MD  01/24/19 0312       Mitch Nation MD  01/24/19 0315     Esdras Rodriguez

## 2025-06-03 NOTE — TELEPHONE ENCOUNTER
Left msg for pt to call us back that he was supposed to come back for lab work since they could not get his blood the first time. To call us back to let us know hes coming back so we can make him a lab appt.   
None known